# Patient Record
Sex: MALE | Race: WHITE | NOT HISPANIC OR LATINO
[De-identification: names, ages, dates, MRNs, and addresses within clinical notes are randomized per-mention and may not be internally consistent; named-entity substitution may affect disease eponyms.]

---

## 2017-07-25 ENCOUNTER — NON-APPOINTMENT (OUTPATIENT)
Age: 67
End: 2017-07-25

## 2017-07-25 ENCOUNTER — APPOINTMENT (OUTPATIENT)
Dept: CARDIOLOGY | Facility: CLINIC | Age: 67
End: 2017-07-25

## 2017-07-25 VITALS
BODY MASS INDEX: 21.92 KG/M2 | SYSTOLIC BLOOD PRESSURE: 111 MMHG | WEIGHT: 180 LBS | OXYGEN SATURATION: 98 % | DIASTOLIC BLOOD PRESSURE: 67 MMHG | HEART RATE: 43 BPM | HEIGHT: 76 IN

## 2017-07-25 DIAGNOSIS — Z82.0 FAMILY HISTORY OF EPILEPSY AND OTHER DISEASES OF THE NERVOUS SYSTEM: ICD-10-CM

## 2017-07-25 DIAGNOSIS — Z87.891 PERSONAL HISTORY OF NICOTINE DEPENDENCE: ICD-10-CM

## 2017-07-25 DIAGNOSIS — C18.1 MALIGNANT NEOPLASM OF APPENDIX: ICD-10-CM

## 2017-07-25 DIAGNOSIS — Z00.00 ENCOUNTER FOR GENERAL ADULT MEDICAL EXAMINATION W/OUT ABNORMAL FINDINGS: ICD-10-CM

## 2017-07-25 DIAGNOSIS — Z82.49 FAMILY HISTORY OF ISCHEMIC HEART DISEASE AND OTHER DISEASES OF THE CIRCULATORY SYSTEM: ICD-10-CM

## 2017-07-25 RX ORDER — MEMANTINE HYDROCHLORIDE 28 MG/1
28 CAPSULE, EXTENDED RELEASE ORAL
Refills: 0 | Status: ACTIVE | COMMUNITY

## 2017-07-25 RX ORDER — PSYLLIUM HUSK 0.4 G
CAPSULE ORAL
Refills: 0 | Status: ACTIVE | COMMUNITY

## 2017-07-25 RX ORDER — COLD-HOT PACK
125 MCG EACH MISCELLANEOUS DAILY
Refills: 0 | Status: ACTIVE | COMMUNITY

## 2017-07-25 RX ORDER — B-COMPLEX WITH VITAMIN C
TABLET ORAL DAILY
Refills: 0 | Status: ACTIVE | COMMUNITY

## 2017-07-25 RX ORDER — FLUOXETINE HYDROCHLORIDE 20 MG/1
20 CAPSULE ORAL
Refills: 0 | Status: ACTIVE | COMMUNITY

## 2017-07-26 ENCOUNTER — APPOINTMENT (OUTPATIENT)
Dept: CARDIOLOGY | Facility: CLINIC | Age: 67
End: 2017-07-26

## 2017-07-28 ENCOUNTER — NON-APPOINTMENT (OUTPATIENT)
Age: 67
End: 2017-07-28

## 2017-08-09 ENCOUNTER — APPOINTMENT (OUTPATIENT)
Dept: CARDIOLOGY | Facility: CLINIC | Age: 67
End: 2017-08-09

## 2017-08-14 ENCOUNTER — APPOINTMENT (OUTPATIENT)
Dept: CARDIOLOGY | Facility: CLINIC | Age: 67
End: 2017-08-14
Payer: MEDICARE

## 2017-08-14 PROCEDURE — 93015 CV STRESS TEST SUPVJ I&R: CPT

## 2017-09-15 ENCOUNTER — APPOINTMENT (OUTPATIENT)
Dept: CARDIOLOGY | Facility: CLINIC | Age: 67
End: 2017-09-15
Payer: MEDICARE

## 2017-09-15 PROCEDURE — A9500: CPT

## 2017-09-15 PROCEDURE — 78452 HT MUSCLE IMAGE SPECT MULT: CPT

## 2017-09-15 PROCEDURE — 93015 CV STRESS TEST SUPVJ I&R: CPT

## 2017-09-19 ENCOUNTER — APPOINTMENT (OUTPATIENT)
Dept: CARDIOLOGY | Facility: CLINIC | Age: 67
End: 2017-09-19
Payer: MEDICARE

## 2017-09-19 VITALS
BODY MASS INDEX: 21.55 KG/M2 | SYSTOLIC BLOOD PRESSURE: 114 MMHG | HEIGHT: 76 IN | WEIGHT: 177 LBS | OXYGEN SATURATION: 100 % | DIASTOLIC BLOOD PRESSURE: 68 MMHG | HEART RATE: 43 BPM

## 2017-09-19 DIAGNOSIS — R94.39 ABNORMAL RESULT OF OTHER CARDIOVASCULAR FUNCTION STUDY: ICD-10-CM

## 2017-09-19 DIAGNOSIS — I49.1 ATRIAL PREMATURE DEPOLARIZATION: ICD-10-CM

## 2017-09-19 PROCEDURE — 99214 OFFICE O/P EST MOD 30 MIN: CPT

## 2018-01-18 ENCOUNTER — NON-APPOINTMENT (OUTPATIENT)
Age: 68
End: 2018-01-18

## 2018-01-18 ENCOUNTER — APPOINTMENT (OUTPATIENT)
Dept: CARDIOLOGY | Facility: CLINIC | Age: 68
End: 2018-01-18
Payer: MEDICARE

## 2018-01-18 VITALS — SYSTOLIC BLOOD PRESSURE: 87 MMHG | DIASTOLIC BLOOD PRESSURE: 52 MMHG | HEART RATE: 41 BPM | OXYGEN SATURATION: 100 %

## 2018-01-18 VITALS — WEIGHT: 170 LBS | BODY MASS INDEX: 20.69 KG/M2

## 2018-01-18 DIAGNOSIS — R94.31 ABNORMAL ELECTROCARDIOGRAM [ECG] [EKG]: ICD-10-CM

## 2018-01-18 DIAGNOSIS — R42 DIZZINESS AND GIDDINESS: ICD-10-CM

## 2018-01-18 PROCEDURE — 93000 ELECTROCARDIOGRAM COMPLETE: CPT

## 2018-01-18 PROCEDURE — 99214 OFFICE O/P EST MOD 30 MIN: CPT | Mod: 25

## 2018-01-18 RX ORDER — DONEPEZIL HYDROCHLORIDE 10 MG/1
10 TABLET ORAL
Qty: 180 | Refills: 0 | Status: ACTIVE | COMMUNITY
Start: 2017-02-08

## 2018-01-18 RX ORDER — LOSARTAN POTASSIUM 100 MG/1
100 TABLET, FILM COATED ORAL DAILY
Qty: 30 | Refills: 5 | Status: DISCONTINUED | COMMUNITY
End: 2018-01-18

## 2018-01-18 RX ORDER — AMLODIPINE BESYLATE 5 MG/1
5 TABLET ORAL DAILY
Qty: 30 | Refills: 1 | Status: DISCONTINUED | COMMUNITY
End: 2018-01-18

## 2018-01-19 ENCOUNTER — APPOINTMENT (OUTPATIENT)
Dept: CARDIOLOGY | Facility: CLINIC | Age: 68
End: 2018-01-19
Payer: MEDICARE

## 2018-01-19 VITALS — OXYGEN SATURATION: 100 % | DIASTOLIC BLOOD PRESSURE: 66 MMHG | SYSTOLIC BLOOD PRESSURE: 116 MMHG | HEART RATE: 39 BPM

## 2018-01-19 VITALS — BODY MASS INDEX: 20.7 KG/M2 | HEIGHT: 76 IN | WEIGHT: 170 LBS

## 2018-01-19 DIAGNOSIS — I95.89 OTHER HYPOTENSION: ICD-10-CM

## 2018-01-19 PROCEDURE — 93000 ELECTROCARDIOGRAM COMPLETE: CPT

## 2018-01-19 PROCEDURE — 99213 OFFICE O/P EST LOW 20 MIN: CPT | Mod: 25

## 2018-02-08 ENCOUNTER — APPOINTMENT (OUTPATIENT)
Dept: CARDIOLOGY | Facility: CLINIC | Age: 68
End: 2018-02-08
Payer: MEDICARE

## 2018-02-08 ENCOUNTER — NON-APPOINTMENT (OUTPATIENT)
Age: 68
End: 2018-02-08

## 2018-02-08 VITALS — SYSTOLIC BLOOD PRESSURE: 123 MMHG | DIASTOLIC BLOOD PRESSURE: 75 MMHG

## 2018-02-08 VITALS — BODY MASS INDEX: 20.82 KG/M2 | HEIGHT: 76 IN | WEIGHT: 171 LBS

## 2018-02-08 VITALS — HEART RATE: 43 BPM | OXYGEN SATURATION: 100 %

## 2018-02-08 DIAGNOSIS — I10 ESSENTIAL (PRIMARY) HYPERTENSION: ICD-10-CM

## 2018-02-08 DIAGNOSIS — R00.1 BRADYCARDIA, UNSPECIFIED: ICD-10-CM

## 2018-02-08 DIAGNOSIS — I49.3 VENTRICULAR PREMATURE DEPOLARIZATION: ICD-10-CM

## 2018-02-08 DIAGNOSIS — F03.90 UNSPECIFIED DEMENTIA W/OUT BEHAVIORAL DISTURBANCE: ICD-10-CM

## 2018-02-08 PROCEDURE — 99214 OFFICE O/P EST MOD 30 MIN: CPT | Mod: 25

## 2018-02-08 PROCEDURE — 93000 ELECTROCARDIOGRAM COMPLETE: CPT

## 2018-02-08 RX ORDER — ASPIRIN 81 MG
81 TABLET, DELAYED RELEASE (ENTERIC COATED) ORAL DAILY
Qty: 90 | Refills: 0 | Status: ACTIVE | COMMUNITY

## 2018-03-03 ENCOUNTER — INPATIENT (INPATIENT)
Facility: HOSPITAL | Age: 68
LOS: 9 days | DRG: 57 | End: 2018-03-13
Attending: INTERNAL MEDICINE | Admitting: INTERNAL MEDICINE
Payer: MEDICARE

## 2018-03-03 VITALS
DIASTOLIC BLOOD PRESSURE: 75 MMHG | OXYGEN SATURATION: 99 % | TEMPERATURE: 98 F | SYSTOLIC BLOOD PRESSURE: 117 MMHG | HEART RATE: 72 BPM | RESPIRATION RATE: 18 BRPM

## 2018-03-03 DIAGNOSIS — R41.82 ALTERED MENTAL STATUS, UNSPECIFIED: ICD-10-CM

## 2018-03-03 DIAGNOSIS — G30.0 ALZHEIMER'S DISEASE WITH EARLY ONSET: ICD-10-CM

## 2018-03-03 LAB
ALBUMIN SERPL ELPH-MCNC: 4.1 G/DL — SIGNIFICANT CHANGE UP (ref 3.3–5)
ALP SERPL-CCNC: 36 U/L — LOW (ref 40–120)
ALT FLD-CCNC: 33 U/L RC — SIGNIFICANT CHANGE UP (ref 10–45)
ANION GAP SERPL CALC-SCNC: 13 MMOL/L — SIGNIFICANT CHANGE UP (ref 5–17)
APPEARANCE UR: CLEAR — SIGNIFICANT CHANGE UP
AST SERPL-CCNC: 34 U/L — SIGNIFICANT CHANGE UP (ref 10–40)
BACTERIA # UR AUTO: ABNORMAL /HPF
BASOPHILS # BLD AUTO: 0 K/UL — SIGNIFICANT CHANGE UP (ref 0–0.2)
BASOPHILS NFR BLD AUTO: 0.6 % — SIGNIFICANT CHANGE UP (ref 0–2)
BILIRUB SERPL-MCNC: 1.8 MG/DL — HIGH (ref 0.2–1.2)
BILIRUB UR-MCNC: NEGATIVE — SIGNIFICANT CHANGE UP
BUN SERPL-MCNC: 13 MG/DL — SIGNIFICANT CHANGE UP (ref 7–23)
CALCIUM SERPL-MCNC: 9.7 MG/DL — SIGNIFICANT CHANGE UP (ref 8.4–10.5)
CHLORIDE SERPL-SCNC: 103 MMOL/L — SIGNIFICANT CHANGE UP (ref 96–108)
CO2 SERPL-SCNC: 27 MMOL/L — SIGNIFICANT CHANGE UP (ref 22–31)
COLOR SPEC: SIGNIFICANT CHANGE UP
CREAT SERPL-MCNC: 0.76 MG/DL — SIGNIFICANT CHANGE UP (ref 0.5–1.3)
DIFF PNL FLD: ABNORMAL
EOSINOPHIL # BLD AUTO: 0 K/UL — SIGNIFICANT CHANGE UP (ref 0–0.5)
EOSINOPHIL NFR BLD AUTO: 0.5 % — SIGNIFICANT CHANGE UP (ref 0–6)
EPI CELLS # UR: SIGNIFICANT CHANGE UP /HPF
GAS PNL BLDV: SIGNIFICANT CHANGE UP
GLUCOSE SERPL-MCNC: 81 MG/DL — SIGNIFICANT CHANGE UP (ref 70–99)
GLUCOSE UR QL: NEGATIVE — SIGNIFICANT CHANGE UP
HCT VFR BLD CALC: 39 % — SIGNIFICANT CHANGE UP (ref 39–50)
HGB BLD-MCNC: 14 G/DL — SIGNIFICANT CHANGE UP (ref 13–17)
KETONES UR-MCNC: NEGATIVE — SIGNIFICANT CHANGE UP
LEUKOCYTE ESTERASE UR-ACNC: ABNORMAL
LYMPHOCYTES # BLD AUTO: 1 K/UL — SIGNIFICANT CHANGE UP (ref 1–3.3)
LYMPHOCYTES # BLD AUTO: 25.1 % — SIGNIFICANT CHANGE UP (ref 13–44)
MAGNESIUM SERPL-MCNC: 1.8 MG/DL — SIGNIFICANT CHANGE UP (ref 1.6–2.6)
MCHC RBC-ENTMCNC: 35 PG — HIGH (ref 27–34)
MCHC RBC-ENTMCNC: 35.9 GM/DL — SIGNIFICANT CHANGE UP (ref 32–36)
MCV RBC AUTO: 97.4 FL — SIGNIFICANT CHANGE UP (ref 80–100)
MONOCYTES # BLD AUTO: 0.4 K/UL — SIGNIFICANT CHANGE UP (ref 0–0.9)
MONOCYTES NFR BLD AUTO: 9 % — SIGNIFICANT CHANGE UP (ref 2–14)
NEUTROPHILS # BLD AUTO: 2.7 K/UL — SIGNIFICANT CHANGE UP (ref 1.8–7.4)
NEUTROPHILS NFR BLD AUTO: 64.8 % — SIGNIFICANT CHANGE UP (ref 43–77)
NITRITE UR-MCNC: NEGATIVE — SIGNIFICANT CHANGE UP
PH UR: 6.5 — SIGNIFICANT CHANGE UP (ref 5–8)
PLATELET # BLD AUTO: 191 K/UL — SIGNIFICANT CHANGE UP (ref 150–400)
POTASSIUM SERPL-MCNC: 3.6 MMOL/L — SIGNIFICANT CHANGE UP (ref 3.5–5.3)
POTASSIUM SERPL-SCNC: 3.6 MMOL/L — SIGNIFICANT CHANGE UP (ref 3.5–5.3)
PROT SERPL-MCNC: 6.9 G/DL — SIGNIFICANT CHANGE UP (ref 6–8.3)
PROT UR-MCNC: NEGATIVE — SIGNIFICANT CHANGE UP
RBC # BLD: 4 M/UL — LOW (ref 4.2–5.8)
RBC # FLD: 11.8 % — SIGNIFICANT CHANGE UP (ref 10.3–14.5)
RBC CASTS # UR COMP ASSIST: SIGNIFICANT CHANGE UP /HPF (ref 0–2)
SODIUM SERPL-SCNC: 143 MMOL/L — SIGNIFICANT CHANGE UP (ref 135–145)
SP GR SPEC: 1.01 — LOW (ref 1.01–1.02)
UROBILINOGEN FLD QL: NEGATIVE — SIGNIFICANT CHANGE UP
WBC # BLD: 4.2 K/UL — SIGNIFICANT CHANGE UP (ref 3.8–10.5)
WBC # FLD AUTO: 4.2 K/UL — SIGNIFICANT CHANGE UP (ref 3.8–10.5)
WBC UR QL: SIGNIFICANT CHANGE UP /HPF (ref 0–5)

## 2018-03-03 PROCEDURE — 71045 X-RAY EXAM CHEST 1 VIEW: CPT | Mod: 26

## 2018-03-03 PROCEDURE — 70450 CT HEAD/BRAIN W/O DYE: CPT | Mod: 26

## 2018-03-03 PROCEDURE — 99285 EMERGENCY DEPT VISIT HI MDM: CPT | Mod: 25

## 2018-03-03 PROCEDURE — 93010 ELECTROCARDIOGRAM REPORT: CPT

## 2018-03-03 RX ORDER — HEPARIN SODIUM 5000 [USP'U]/ML
5000 INJECTION INTRAVENOUS; SUBCUTANEOUS EVERY 12 HOURS
Qty: 0 | Refills: 0 | Status: DISCONTINUED | OUTPATIENT
Start: 2018-03-03 | End: 2018-03-13

## 2018-03-03 RX ORDER — OMEGA-3 ACID ETHYL ESTERS 1 G
1 CAPSULE ORAL
Qty: 0 | Refills: 0 | COMMUNITY

## 2018-03-03 RX ORDER — ASPIRIN/CALCIUM CARB/MAGNESIUM 324 MG
81 TABLET ORAL DAILY
Qty: 0 | Refills: 0 | Status: DISCONTINUED | OUTPATIENT
Start: 2018-03-03 | End: 2018-03-12

## 2018-03-03 RX ORDER — MEMANTINE HYDROCHLORIDE 10 MG/1
10 TABLET ORAL
Qty: 0 | Refills: 0 | Status: DISCONTINUED | OUTPATIENT
Start: 2018-03-03 | End: 2018-03-11

## 2018-03-03 RX ORDER — DONEPEZIL HYDROCHLORIDE 10 MG/1
10 TABLET, FILM COATED ORAL AT BEDTIME
Qty: 0 | Refills: 0 | Status: DISCONTINUED | OUTPATIENT
Start: 2018-03-03 | End: 2018-03-06

## 2018-03-03 RX ORDER — INFLUENZA VIRUS VACCINE 15; 15; 15; 15 UG/.5ML; UG/.5ML; UG/.5ML; UG/.5ML
0.5 SUSPENSION INTRAMUSCULAR ONCE
Qty: 0 | Refills: 0 | Status: DISCONTINUED | OUTPATIENT
Start: 2018-03-03 | End: 2018-03-13

## 2018-03-03 RX ORDER — FLUOXETINE HCL 10 MG
10 CAPSULE ORAL DAILY
Qty: 0 | Refills: 0 | Status: DISCONTINUED | OUTPATIENT
Start: 2018-03-03 | End: 2018-03-11

## 2018-03-03 RX ORDER — HALOPERIDOL DECANOATE 100 MG/ML
2.5 INJECTION INTRAMUSCULAR ONCE
Qty: 0 | Refills: 0 | Status: COMPLETED | OUTPATIENT
Start: 2018-03-03 | End: 2018-03-03

## 2018-03-03 RX ORDER — MEMANTINE HYDROCHLORIDE 10 MG/1
1 TABLET ORAL
Qty: 0 | Refills: 0 | COMMUNITY

## 2018-03-03 RX ORDER — CHOLECALCIFEROL (VITAMIN D3) 125 MCG
1 CAPSULE ORAL
Qty: 0 | Refills: 0 | COMMUNITY

## 2018-03-03 RX ORDER — SODIUM CHLORIDE 9 MG/ML
1000 INJECTION INTRAMUSCULAR; INTRAVENOUS; SUBCUTANEOUS ONCE
Qty: 0 | Refills: 0 | Status: COMPLETED | OUTPATIENT
Start: 2018-03-03 | End: 2018-03-03

## 2018-03-03 RX ORDER — FLUOXETINE HCL 10 MG
1 CAPSULE ORAL
Qty: 0 | Refills: 0 | COMMUNITY

## 2018-03-03 RX ORDER — ASPIRIN/CALCIUM CARB/MAGNESIUM 324 MG
1 TABLET ORAL
Qty: 0 | Refills: 0 | COMMUNITY

## 2018-03-03 RX ADMIN — HALOPERIDOL DECANOATE 2.5 MILLIGRAM(S): 100 INJECTION INTRAMUSCULAR at 15:00

## 2018-03-03 RX ADMIN — HALOPERIDOL DECANOATE 2.5 MILLIGRAM(S): 100 INJECTION INTRAMUSCULAR at 12:55

## 2018-03-03 RX ADMIN — DONEPEZIL HYDROCHLORIDE 10 MILLIGRAM(S): 10 TABLET, FILM COATED ORAL at 21:40

## 2018-03-03 RX ADMIN — HEPARIN SODIUM 5000 UNIT(S): 5000 INJECTION INTRAVENOUS; SUBCUTANEOUS at 21:41

## 2018-03-03 RX ADMIN — SODIUM CHLORIDE 3000 MILLILITER(S): 9 INJECTION INTRAMUSCULAR; INTRAVENOUS; SUBCUTANEOUS at 12:55

## 2018-03-03 NOTE — ED PROVIDER NOTE - NEUROLOGICAL LEVEL OF CONSCIOUSNESS
aao x 1 to name; follows  some commands; perrla eomi; no facial droop; right le strength 5/5 left 3/5; upper extr 4/5 bl

## 2018-03-03 NOTE — ED PROVIDER NOTE - OBJECTIVE STATEMENT
Pt has been treated for dementia for the past x3 years presents to the ED for incontinence and attempting t leave the house. Pt has no fever, chills. Pt has been to a neurologist and showed a possible mini stroke x3 yrs ago. This morning the pt was shaking and clenching his hands. Cardiologist is Dr. Palla [phone], neurologist is  Dr. Monica Toscano [phone], and the PMD is  Dr. Aurora Booker [phone]. Experiences no fever, chills, N/V. Experiences no palpitation, chest pain, or SOB. No constipation or diarrhea. No dysuria or hematuria. Pt has had no recent travel, and no sick contacts. Pt has been treated for dementia, bradycardia (being watched by his cardiologist no pacemaker at this time as per family) for the past x3 years presents to the ED for worsening incontinence and aggetation x 3 weeks, attempting to leave the house. Pt has no fever, chills. Pt has been to a neurologist and showed a possible mini stroke x3 yrs ago. This morning the pt was shaking and clenching his hands. Cardiologist is Dr. Palla [phone], neurologist is  Dr. Monica Toscano [phone], and the PMD is  Dr. Aurora Booker [phone]. Experiences no fever, chills, N/V. Experiences no palpitation, chest pain, or SOB. No constipation. + diarrhea 2 days ago. No hematuria. Pt has had no recent travel, and no sick contacts. family notes that pt is more unstable when he walks. P

## 2018-03-03 NOTE — ED ADULT NURSE NOTE - OBJECTIVE STATEMENT
66 y/o male presents to ED c/o worsening confusion over the past 3 weeks. Patient, at baseline A&Ox1 to self dx with dementia 3 years ago. Patient's wife reports worsening confusion over the past 3 weeks, with increasing agitation and bowel/bladder incontinence for the past 3 days. Patient's wife reports patient unsteady while ambulating and states there have been "several non-serious falls over the past 6 months". Patient's wife denies fever/chills, LOC, recent illness/travel, CP, SOB, palpitations, n/v/d. Patient A&Ox1, breathing spontaneously, airway patent, b/l clear lungs, +pulses, cap refill <2 seconds, abdomen nontender, +bowel sounds. Patient resting in bed, family at bedside, call bell within reach, side bells up. Plan of care explained.

## 2018-03-03 NOTE — ED ADULT NURSE REASSESSMENT NOTE - NS ED NURSE REASSESS COMMENT FT1
Patient placed on 1:1 observation for safety. Patient was attempting to get OOB without assistance. Patient safely in bed at this time with 1:1 at bedside.

## 2018-03-03 NOTE — H&P ADULT - HISTORY OF PRESENT ILLNESS
Pt has been treated for dementia, bradycardia (being watched by his cardiologist no pacemaker at this time as per family) for the past x3 years presents to the ED for worsening incontinence and aggetation x 3 weeks, attempting to leave the house. Pt has no fever, chills. Pt has been to a neurologist and showed a possible mini stroke x3 yrs ago. This morning the pt was shaking and clenching his hands. Cardiologist is Dr. Palla [phone], neurologist is  Dr. Monica Toscano [phone], and the PMD is  Dr. Aurora Booker [phone]. Experiences no fever, chills, N/V. Experiences no palpitation, chest pain, or SOB. No constipation. + diarrhea 2 days ago. No hematuria. Pt has had no recent travel, and no sick contacts. family notes that pt is more unstable when he walks.

## 2018-03-03 NOTE — ED PROVIDER NOTE - MEDICAL DECISION MAKING DETAILS
AMS- most likely worsening dementia with gait instability; urinary incontinence aggitation vs normal pressure hydrocephalus; vs infectious iteology--will fu cxr/ua labs; ct head--ADMIT

## 2018-03-03 NOTE — ED ADULT NURSE REASSESSMENT NOTE - NS ED NURSE REASSESS COMMENT FT1
Straight cath attempted with nash and coude catheter. Unable to obtain urine. 2nd RN present, sterile technique maintained. MD Tavera made aware.

## 2018-03-04 DIAGNOSIS — R00.1 BRADYCARDIA, UNSPECIFIED: ICD-10-CM

## 2018-03-04 LAB
ANION GAP SERPL CALC-SCNC: 9 MMOL/L — SIGNIFICANT CHANGE UP (ref 5–17)
BUN SERPL-MCNC: 10 MG/DL — SIGNIFICANT CHANGE UP (ref 7–23)
CALCIUM SERPL-MCNC: 9.3 MG/DL — SIGNIFICANT CHANGE UP (ref 8.4–10.5)
CHLORIDE SERPL-SCNC: 104 MMOL/L — SIGNIFICANT CHANGE UP (ref 96–108)
CO2 SERPL-SCNC: 30 MMOL/L — SIGNIFICANT CHANGE UP (ref 22–31)
CREAT SERPL-MCNC: 0.81 MG/DL — SIGNIFICANT CHANGE UP (ref 0.5–1.3)
CULTURE RESULTS: SIGNIFICANT CHANGE UP
GLUCOSE SERPL-MCNC: 85 MG/DL — SIGNIFICANT CHANGE UP (ref 70–99)
HCT VFR BLD CALC: 36.9 % — LOW (ref 39–50)
HGB BLD-MCNC: 12.9 G/DL — LOW (ref 13–17)
MCHC RBC-ENTMCNC: 34.2 PG — HIGH (ref 27–34)
MCHC RBC-ENTMCNC: 35.1 GM/DL — SIGNIFICANT CHANGE UP (ref 32–36)
MCV RBC AUTO: 97.4 FL — SIGNIFICANT CHANGE UP (ref 80–100)
PLATELET # BLD AUTO: 171 K/UL — SIGNIFICANT CHANGE UP (ref 150–400)
POTASSIUM SERPL-MCNC: 3.3 MMOL/L — LOW (ref 3.5–5.3)
POTASSIUM SERPL-SCNC: 3.3 MMOL/L — LOW (ref 3.5–5.3)
RBC # BLD: 3.78 M/UL — LOW (ref 4.2–5.8)
RBC # FLD: 11.7 % — SIGNIFICANT CHANGE UP (ref 10.3–14.5)
SODIUM SERPL-SCNC: 143 MMOL/L — SIGNIFICANT CHANGE UP (ref 135–145)
SPECIMEN SOURCE: SIGNIFICANT CHANGE UP
WBC # BLD: 4 K/UL — SIGNIFICANT CHANGE UP (ref 3.8–10.5)
WBC # FLD AUTO: 4 K/UL — SIGNIFICANT CHANGE UP (ref 3.8–10.5)

## 2018-03-04 RX ADMIN — HEPARIN SODIUM 5000 UNIT(S): 5000 INJECTION INTRAVENOUS; SUBCUTANEOUS at 05:38

## 2018-03-04 RX ADMIN — MEMANTINE HYDROCHLORIDE 10 MILLIGRAM(S): 10 TABLET ORAL at 05:39

## 2018-03-04 RX ADMIN — Medication 10 MILLIGRAM(S): at 07:43

## 2018-03-04 RX ADMIN — Medication 81 MILLIGRAM(S): at 07:43

## 2018-03-04 RX ADMIN — HEPARIN SODIUM 5000 UNIT(S): 5000 INJECTION INTRAVENOUS; SUBCUTANEOUS at 17:07

## 2018-03-04 RX ADMIN — MEMANTINE HYDROCHLORIDE 10 MILLIGRAM(S): 10 TABLET ORAL at 17:07

## 2018-03-04 RX ADMIN — DONEPEZIL HYDROCHLORIDE 10 MILLIGRAM(S): 10 TABLET, FILM COATED ORAL at 21:41

## 2018-03-04 NOTE — CONSULT NOTE ADULT - SUBJECTIVE AND OBJECTIVE BOX
Chief Complaint:  altered mental status    HPI:  Pt has been treated for dementia, bradycardia (being watched by his cardiologist no pacemaker at this time as per family) for the past x3 years presents to the ED for worsening incontinence and aggetation x 3 weeks, attempting to leave the house. Pt has no fever, chills. Pt has been to a neurologist and showed a possible mini stroke x3 yrs ago. This morning the pt was shaking and clenching his hands. Cardiologist is Dr. Palla [phone], neurologist is  Dr. Monica Toscano [phone], and the PMD is  Dr. Aurora Booker [phone]. Experiences no fever, chills, N/V. Experiences no palpitation, chest pain, or SOB. No constipation. + diarrhea 2 days ago. No hematuria. Pt has had no recent travel, and no sick contacts. family notes that pt is more unstable when he walks. (03 Mar 2018 19:53)    Patient's family not by bedside, patient very slow to respond however no complaints currently. Patient down to sinus evangelista in 50's on tele however has been asymptomatic/no changes in mental status during these episodes. Otherwise sinus 60-80's.  Patient sees Dr. Palla as outatpeint for cardiology. Per his notes, patient has had sinus bradycardia in past, had recent stress test where HR went to 85% target with exercise.       PMH:   Dementia      PSH:   No significant past surgical history      Medications:   aspirin  chewable 81 milliGRAM(s) Oral daily  donepezil 10 milliGRAM(s) Oral at bedtime  FLUoxetine 10 milliGRAM(s) Oral daily  heparin  Injectable 5000 Unit(s) SubCutaneous every 12 hours  influenza   Vaccine 0.5 milliLiter(s) IntraMuscular once  memantine 10 milliGRAM(s) Oral two times a day      Allergies:  No Known Allergies      FAMILY HISTORY:  No pertinent family history in first degree relatives      Social History:  Smoking History: denies  Alcohol Use: denies  Drug Use: denies    Review of Systems:  REVIEW OF SYSTEMS:    CONSTITUTIONAL: No weakness, fevers or chills  EYES/ENT: No visual changes;  No dysphagia  NECK: No pain or stiffness  RESPIRATORY: No cough, wheezing, hemoptysis; No shortness of breath  CARDIOVASCULAR: No chest pain or palpitations; No lower extremity edema  GASTROINTESTINAL: No abdominal or epigastric pain. No nausea, vomiting, or hematemesis; No diarrhea or constipation. No melena or hematochezia.  BACK: No back pain  GENITOURINARY: No dysuria, frequency or hematuria  NEUROLOGICAL: No numbness or weakness  SKIN: No itching, burning, rashes, or lesions       Physical Exam:  T(F): 97.4 (03-04), Max: 98.2 (03-03)  HR: 46 (03-04) (42 - 54)  BP: 147/84 (03-04) (147/84 - 172/82)  RR: 18 (03-04)  SpO2: 99% (03-04)  GENERAL: No acute distress  HEAD:  Atraumatic  ENT: no JVD  CHEST/LUNG: Clear to auscultation bilaterally  HEART: Regular rate and rhythm  ABDOMEN: Soft, Nontender  EXTREMITIES:  No edema  NEUROLOGY: alert but slow to respond to questions, non-focal, cranial nerves intact  SKIN: Normal color, No rashes or lesions  LINES:    Cardiovascular Diagnostic Testing:    ECG: sinus evangelista with PACs      Labs: Personally reviewed                        12.9   4.0   )-----------( 171      ( 04 Mar 2018 06:55 )             36.9     03-04    143  |  104  |  10  ----------------------------<  85  3.3<L>   |  30  |  0.81    Ca    9.3      04 Mar 2018 06:55  Mg     1.8     03-03    TPro  6.9  /  Alb  4.1  /  TBili  1.8<H>  /  DBili  x   /  AST  34  /  ALT  33  /  AlkPhos  36<L>  03-03

## 2018-03-05 LAB
ANION GAP SERPL CALC-SCNC: 11 MMOL/L — SIGNIFICANT CHANGE UP (ref 5–17)
ANION GAP SERPL CALC-SCNC: 4 MMOL/L — LOW (ref 5–17)
BUN SERPL-MCNC: 11 MG/DL — SIGNIFICANT CHANGE UP (ref 7–23)
BUN SERPL-MCNC: 14 MG/DL — SIGNIFICANT CHANGE UP (ref 7–23)
CALCIUM SERPL-MCNC: 9.5 MG/DL — SIGNIFICANT CHANGE UP (ref 8.4–10.5)
CALCIUM SERPL-MCNC: 9.6 MG/DL — SIGNIFICANT CHANGE UP (ref 8.4–10.5)
CHLORIDE SERPL-SCNC: 105 MMOL/L — SIGNIFICANT CHANGE UP (ref 96–108)
CHLORIDE SERPL-SCNC: 105 MMOL/L — SIGNIFICANT CHANGE UP (ref 96–108)
CO2 SERPL-SCNC: 28 MMOL/L — SIGNIFICANT CHANGE UP (ref 22–31)
CO2 SERPL-SCNC: 34 MMOL/L — HIGH (ref 22–31)
CREAT SERPL-MCNC: 0.71 MG/DL — SIGNIFICANT CHANGE UP (ref 0.5–1.3)
CREAT SERPL-MCNC: 0.75 MG/DL — SIGNIFICANT CHANGE UP (ref 0.5–1.3)
GLUCOSE SERPL-MCNC: 125 MG/DL — HIGH (ref 70–99)
GLUCOSE SERPL-MCNC: 96 MG/DL — SIGNIFICANT CHANGE UP (ref 70–99)
MAGNESIUM SERPL-MCNC: 1.7 MG/DL — SIGNIFICANT CHANGE UP (ref 1.6–2.6)
MAGNESIUM SERPL-MCNC: 1.8 MG/DL — SIGNIFICANT CHANGE UP (ref 1.6–2.6)
POTASSIUM SERPL-MCNC: 3.4 MMOL/L — LOW (ref 3.5–5.3)
POTASSIUM SERPL-MCNC: 4 MMOL/L — SIGNIFICANT CHANGE UP (ref 3.5–5.3)
POTASSIUM SERPL-SCNC: 3.4 MMOL/L — LOW (ref 3.5–5.3)
POTASSIUM SERPL-SCNC: 4 MMOL/L — SIGNIFICANT CHANGE UP (ref 3.5–5.3)
SODIUM SERPL-SCNC: 143 MMOL/L — SIGNIFICANT CHANGE UP (ref 135–145)
SODIUM SERPL-SCNC: 144 MMOL/L — SIGNIFICANT CHANGE UP (ref 135–145)
TSH SERPL-MCNC: 2.74 UIU/ML — SIGNIFICANT CHANGE UP (ref 0.27–4.2)

## 2018-03-05 RX ORDER — POTASSIUM CHLORIDE 20 MEQ
40 PACKET (EA) ORAL ONCE
Qty: 0 | Refills: 0 | Status: COMPLETED | OUTPATIENT
Start: 2018-03-05 | End: 2018-03-05

## 2018-03-05 RX ORDER — MAGNESIUM SULFATE 500 MG/ML
2 VIAL (ML) INJECTION ONCE
Qty: 0 | Refills: 0 | Status: COMPLETED | OUTPATIENT
Start: 2018-03-05 | End: 2018-03-05

## 2018-03-05 RX ADMIN — Medication 50 GRAM(S): at 23:20

## 2018-03-05 RX ADMIN — Medication 40 MILLIEQUIVALENT(S): at 09:05

## 2018-03-05 RX ADMIN — MEMANTINE HYDROCHLORIDE 10 MILLIGRAM(S): 10 TABLET ORAL at 05:19

## 2018-03-05 RX ADMIN — Medication 50 GRAM(S): at 07:04

## 2018-03-05 RX ADMIN — Medication 40 MILLIEQUIVALENT(S): at 05:58

## 2018-03-05 RX ADMIN — DONEPEZIL HYDROCHLORIDE 10 MILLIGRAM(S): 10 TABLET, FILM COATED ORAL at 22:02

## 2018-03-05 RX ADMIN — HEPARIN SODIUM 5000 UNIT(S): 5000 INJECTION INTRAVENOUS; SUBCUTANEOUS at 05:19

## 2018-03-05 RX ADMIN — Medication 10 MILLIGRAM(S): at 09:03

## 2018-03-05 RX ADMIN — Medication 81 MILLIGRAM(S): at 09:03

## 2018-03-05 RX ADMIN — MEMANTINE HYDROCHLORIDE 10 MILLIGRAM(S): 10 TABLET ORAL at 17:19

## 2018-03-05 RX ADMIN — HEPARIN SODIUM 5000 UNIT(S): 5000 INJECTION INTRAVENOUS; SUBCUTANEOUS at 17:19

## 2018-03-05 NOTE — PHYSICAL THERAPY INITIAL EVALUATION ADULT - PRECAUTIONS/LIMITATIONS, REHAB EVAL
isolation precautions/fall precautions/Pt has been treated for dementia, bradycardia (being watched by his cardiologist no pacemaker at this time as per family) for the past x3 years presents to the ED for worsening incontinence and aggetation x 3 weeks, attempting to leave the house. Pt has no fever, chills. Pt has been to a neurologist and showed a possible mini stroke x3 yrs ago. This morning the pt was shaking and clenching his hands. Cardiologist is Dr. Palla [phone], neurologist is  Dr. Monica Toscano [phone], and the PMD is  Dr. Aurora Booker [phone]. Experiences no fever, chills, N/V. Experiences no palpitation, chest pain, or SOB. No constipation. + diarrhea 2 days ago. No hematuria. Pt has had no recent travel, and no sick contacts. family notes that pt is more unstable when he walks. Pt has been treated for dementia, bradycardia (being watched by his cardiologist no pacemaker at this time as per family) for the past x3 years presents to the ED for worsening incontinence and aggetation x 3 weeks, attempting to leave the house. Pt has no fever, chills. Pt has been to a neurologist and showed a possible mini stroke x3 yrs ago. This morning the pt was shaking and clenching his hands. Cardiologist is Dr. Palla [phone], neurologist is  Dr. Monica Toscano [phone], and the PMD is  Dr. Aurora Booker [phone]. Experiences no fever, chills, N/V. Experiences no palpitation, chest pain, or SOB. No constipation. + diarrhea 2 days ago. No hematuria. Pt has had no recent travel, and no sick contacts. family notes that pt is more unstable when he walks./fall precautions

## 2018-03-05 NOTE — PROVIDER CONTACT NOTE (OTHER) - ASSESSMENT
Patient A&Ox1, calm. HR 51, /86, RR 18, O2 saturation 98% on room air. Patient denies chest pain, lightheadedness, or shortness of breath. Patient denies presence of pain.

## 2018-03-05 NOTE — PHYSICAL THERAPY INITIAL EVALUATION ADULT - TRANSFER SAFETY CONCERNS NOTED: SIT/STAND, REHAB EVAL
decreased balance during turns/losing balance/decreased proprioception/decreased safety awareness/decreased weight-shifting ability

## 2018-03-05 NOTE — PROGRESS NOTE ADULT - ASSESSMENT
68yo M with PMHx dementia, known sinus bradycardia presents to the ED for worsening incontinence and agitation x 3 weeks. Consulted for PPM evaluation. Per chart review, patient's cardiologist Dr. Palla patient has had sinus bradycardia in the past with chronotropic response with exercise. PAT, unknown if symptomatic, overnight.  -no PPM at this time  -hold AVN agents. will need to reconsider if symptomatic PAT.    please call with questions or if there is a change in clinical status.  [phone]

## 2018-03-05 NOTE — PHYSICAL THERAPY INITIAL EVALUATION ADULT - ADDITIONAL COMMENTS
HEAD CT :Parenchymal volume loss and chronic microvascular changes are again seen. Abnormal areas of high attenuation are identified in bilateral cerebellar, bilateral basal ganglia and bilateral centrum semiovale region. These findings are likely related to areas of mineralization and were present on the prior study and appear relatively unchanged. While these findings could be related to age, the possibility of underlying metabolic disorder cannot be entirely excluded;;        pt lives in house with spouse Tiara spouse [phone]) assist as need ; pt independent with functional activity and adl's ; 3 steps to enter with rail ; per CM note pt wife interest in Subacute rehab and transition to possible  long term care

## 2018-03-06 LAB
ANION GAP SERPL CALC-SCNC: 7 MMOL/L — SIGNIFICANT CHANGE UP (ref 5–17)
BUN SERPL-MCNC: 11 MG/DL — SIGNIFICANT CHANGE UP (ref 7–23)
CALCIUM SERPL-MCNC: 9.8 MG/DL — SIGNIFICANT CHANGE UP (ref 8.4–10.5)
CHLORIDE SERPL-SCNC: 104 MMOL/L — SIGNIFICANT CHANGE UP (ref 96–108)
CO2 SERPL-SCNC: 32 MMOL/L — HIGH (ref 22–31)
CREAT SERPL-MCNC: 0.74 MG/DL — SIGNIFICANT CHANGE UP (ref 0.5–1.3)
GLUCOSE SERPL-MCNC: 89 MG/DL — SIGNIFICANT CHANGE UP (ref 70–99)
MAGNESIUM SERPL-MCNC: 2.2 MG/DL — SIGNIFICANT CHANGE UP (ref 1.6–2.6)
POTASSIUM SERPL-MCNC: 3.9 MMOL/L — SIGNIFICANT CHANGE UP (ref 3.5–5.3)
POTASSIUM SERPL-SCNC: 3.9 MMOL/L — SIGNIFICANT CHANGE UP (ref 3.5–5.3)
SODIUM SERPL-SCNC: 143 MMOL/L — SIGNIFICANT CHANGE UP (ref 135–145)

## 2018-03-06 PROCEDURE — 93306 TTE W/DOPPLER COMPLETE: CPT | Mod: 26

## 2018-03-06 PROCEDURE — 93010 ELECTROCARDIOGRAM REPORT: CPT

## 2018-03-06 PROCEDURE — 75561 CARDIAC MRI FOR MORPH W/DYE: CPT | Mod: 26

## 2018-03-06 PROCEDURE — 70551 MRI BRAIN STEM W/O DYE: CPT | Mod: 26

## 2018-03-06 RX ORDER — POTASSIUM CHLORIDE 20 MEQ
40 PACKET (EA) ORAL ONCE
Qty: 0 | Refills: 0 | Status: COMPLETED | OUTPATIENT
Start: 2018-03-06 | End: 2018-03-06

## 2018-03-06 RX ADMIN — HEPARIN SODIUM 5000 UNIT(S): 5000 INJECTION INTRAVENOUS; SUBCUTANEOUS at 18:12

## 2018-03-06 RX ADMIN — MEMANTINE HYDROCHLORIDE 10 MILLIGRAM(S): 10 TABLET ORAL at 05:41

## 2018-03-06 RX ADMIN — HEPARIN SODIUM 5000 UNIT(S): 5000 INJECTION INTRAVENOUS; SUBCUTANEOUS at 05:41

## 2018-03-06 RX ADMIN — Medication 10 MILLIGRAM(S): at 11:26

## 2018-03-06 RX ADMIN — Medication 81 MILLIGRAM(S): at 11:25

## 2018-03-06 RX ADMIN — MEMANTINE HYDROCHLORIDE 10 MILLIGRAM(S): 10 TABLET ORAL at 18:09

## 2018-03-06 RX ADMIN — Medication 40 MILLIEQUIVALENT(S): at 02:05

## 2018-03-06 NOTE — PROVIDER CONTACT NOTE (OTHER) - ASSESSMENT
Pt asymptomatic. Denies chest pain, N/V, or discomfort. /113 . Mg 1.8 patient was given Mag sulfate. K4.0.

## 2018-03-06 NOTE — CHART NOTE - NSCHARTNOTEFT_GEN_A_CORE
NP Note (episodic)     Called by RN because pt's HR is 130-140s sustained for >20 minutes, occasionally evangelista for a few seconds, baseline bradycardic in the 40s. Pt is hemodynamically stable and asymptomatic - no CP palpitations dizziness.     - keep K >4, Mg >2   - c/w telemonitoring and close monitoring for symptoms   - EP aware (overnight spectra [id number]) will f/u in AM and is available for further recs overnight    - obtain EKG     - Depeika Guillen NP [id number]

## 2018-03-06 NOTE — CHART NOTE - NSCHARTNOTEFT_GEN_A_CORE
I attempted to see patient however he was not in his room.  As per wife and daughter the patient has had dementia for several years,  he is currently seeing an outpatient neurologist who has classified him as alzheimer's type.  The patient's history is significant for semantic/anomic aphasia.  He has had an acute decompensation since Wednesday of last with increased agitation as well incontinence and stiffness.  I cannot make recommendations until I have seen the patient.  However, donepezil can cause bradycardia and its acute role is limited, I would suggest discontinuation of donepezil.

## 2018-03-07 LAB
ANION GAP SERPL CALC-SCNC: 11 MMOL/L — SIGNIFICANT CHANGE UP (ref 5–17)
BUN SERPL-MCNC: 14 MG/DL — SIGNIFICANT CHANGE UP (ref 7–23)
CALCIUM SERPL-MCNC: 10 MG/DL — SIGNIFICANT CHANGE UP (ref 8.4–10.5)
CALCIUM SERPL-MCNC: 9.8 MG/DL — SIGNIFICANT CHANGE UP (ref 8.4–10.5)
CHLORIDE SERPL-SCNC: 104 MMOL/L — SIGNIFICANT CHANGE UP (ref 96–108)
CO2 SERPL-SCNC: 28 MMOL/L — SIGNIFICANT CHANGE UP (ref 22–31)
CREAT SERPL-MCNC: 0.72 MG/DL — SIGNIFICANT CHANGE UP (ref 0.5–1.3)
GLUCOSE SERPL-MCNC: 92 MG/DL — SIGNIFICANT CHANGE UP (ref 70–99)
HCT VFR BLD CALC: 41.5 % — SIGNIFICANT CHANGE UP (ref 39–50)
HGB BLD-MCNC: 14.4 G/DL — SIGNIFICANT CHANGE UP (ref 13–17)
MCHC RBC-ENTMCNC: 33.6 PG — SIGNIFICANT CHANGE UP (ref 27–34)
MCHC RBC-ENTMCNC: 34.7 GM/DL — SIGNIFICANT CHANGE UP (ref 32–36)
MCV RBC AUTO: 96.6 FL — SIGNIFICANT CHANGE UP (ref 80–100)
PLATELET # BLD AUTO: 179 K/UL — SIGNIFICANT CHANGE UP (ref 150–400)
POTASSIUM SERPL-MCNC: 3.9 MMOL/L — SIGNIFICANT CHANGE UP (ref 3.5–5.3)
POTASSIUM SERPL-SCNC: 3.9 MMOL/L — SIGNIFICANT CHANGE UP (ref 3.5–5.3)
PTH-INTACT FLD-MCNC: 31 PG/ML — SIGNIFICANT CHANGE UP (ref 15–65)
RBC # BLD: 4.29 M/UL — SIGNIFICANT CHANGE UP (ref 4.2–5.8)
RBC # FLD: 11.6 % — SIGNIFICANT CHANGE UP (ref 10.3–14.5)
SODIUM SERPL-SCNC: 143 MMOL/L — SIGNIFICANT CHANGE UP (ref 135–145)
WBC # BLD: 5.6 K/UL — SIGNIFICANT CHANGE UP (ref 3.8–10.5)
WBC # FLD AUTO: 5.6 K/UL — SIGNIFICANT CHANGE UP (ref 3.8–10.5)

## 2018-03-07 RX ORDER — AMLODIPINE BESYLATE 2.5 MG/1
1 TABLET ORAL
Qty: 0 | Refills: 0 | COMMUNITY
Start: 2018-03-07

## 2018-03-07 RX ORDER — DOCUSATE SODIUM 100 MG
1 CAPSULE ORAL
Qty: 0 | Refills: 0 | COMMUNITY
Start: 2018-03-07

## 2018-03-07 RX ORDER — DONEPEZIL HYDROCHLORIDE 10 MG/1
1 TABLET, FILM COATED ORAL
Qty: 0 | Refills: 0 | COMMUNITY

## 2018-03-07 RX ORDER — AMLODIPINE BESYLATE 2.5 MG/1
5 TABLET ORAL DAILY
Qty: 0 | Refills: 0 | Status: DISCONTINUED | OUTPATIENT
Start: 2018-03-07 | End: 2018-03-12

## 2018-03-07 RX ORDER — DOCUSATE SODIUM 100 MG
100 CAPSULE ORAL
Qty: 0 | Refills: 0 | Status: DISCONTINUED | OUTPATIENT
Start: 2018-03-07 | End: 2018-03-07

## 2018-03-07 RX ORDER — SENNA PLUS 8.6 MG/1
2 TABLET ORAL
Qty: 0 | Refills: 0 | COMMUNITY
Start: 2018-03-07

## 2018-03-07 RX ORDER — SENNA PLUS 8.6 MG/1
2 TABLET ORAL AT BEDTIME
Qty: 0 | Refills: 0 | Status: DISCONTINUED | OUTPATIENT
Start: 2018-03-07 | End: 2018-03-07

## 2018-03-07 RX ADMIN — Medication 81 MILLIGRAM(S): at 11:07

## 2018-03-07 RX ADMIN — MEMANTINE HYDROCHLORIDE 10 MILLIGRAM(S): 10 TABLET ORAL at 17:26

## 2018-03-07 RX ADMIN — AMLODIPINE BESYLATE 5 MILLIGRAM(S): 2.5 TABLET ORAL at 11:07

## 2018-03-07 RX ADMIN — HEPARIN SODIUM 5000 UNIT(S): 5000 INJECTION INTRAVENOUS; SUBCUTANEOUS at 17:26

## 2018-03-07 RX ADMIN — Medication 10 MILLIGRAM(S): at 11:07

## 2018-03-07 RX ADMIN — HEPARIN SODIUM 5000 UNIT(S): 5000 INJECTION INTRAVENOUS; SUBCUTANEOUS at 05:50

## 2018-03-07 RX ADMIN — MEMANTINE HYDROCHLORIDE 10 MILLIGRAM(S): 10 TABLET ORAL at 05:50

## 2018-03-07 NOTE — DISCHARGE NOTE ADULT - CARE PROVIDERS DIRECT ADDRESSES
,[email or web address],DirectAddress_Unknown,DirectAddress_Unknown,DirectAddress_Unknown

## 2018-03-07 NOTE — CHART NOTE - NSCHARTNOTEFT_GEN_A_CORE
Constant observation was discontinue on March 5TH, 2018. Pt remains calm and cooperative.   Pio NP   # [id number]

## 2018-03-07 NOTE — DISCHARGE NOTE ADULT - PATIENT PORTAL LINK FT
You can access the [facility] Patient Portal, offered by [facility], by registering with the following website: [email or web address]

## 2018-03-07 NOTE — DISCHARGE NOTE ADULT - PLAN OF CARE
stable Stable during hospital stay. MRI brain performed , no significant findings to explain worsening AMS   follow up with Neurology Per chart review, patient's cardiologist Dr. Palla patient has had sinus bradycardia in the past with chronotropic response with exercise. PAT, unknown if symptomatic  -no PPM at this time  -hold AVN agents. will need to reconsider if symptomatic PAT.  follow up with your own cardiologist Dementia causes memory problems and make it hard to think clear. The symptoms of dementia often start off very mild and get worse slowly. Symptoms are forgetfulness, confusion, trouble with language, getting lost in familiar places. As dementia gets worse, it can cause anger or aggression, see things that aren't there, decreased ability to eat, bathe, dress, or do other everyday tasks, or cause people to lose bladder and bowel control. Alzheimer disease, there are medicines that might help some. If you have vascular dementia, your doctor will focus on keeping your blood pressure and cholesterol as normal as possible. Sadly, there really aren't good treatments for most types of dementia.  Prevent falls and accidents by securing loose rugs or use non-skid rugs, loose wires or electrical cords. Wear sturdy, comfortable shoes, keep walkways well lit. There are no proven ways to prevent dementia. But encourage physical activity, healthy diet and social interaction to help keep the brain healthy. Keep medications, sharp knives, lighters, matches, power tools, and guns out of reach. Lower the temperature on water heaters. Keep familiar objects and people around. Use reminders, notes, or directions for daily activities or tasks. Keep a simple, consistent routine for waking, meals, bathing, dressing, and bedtime. Display emergency numbers and home address near all telephones.   SEEK MEDICAL CARE IF: New behavioral problems start such as moodiness, aggressiveness, or seeing things that are not there (hallucinations). Any new problem with brain function happens. This includes problems with balance, speech, swallowing difficulty or falling a lot. Small changes or worsening in any aspect of brain function can be a sign that the illness is getting worse or a sign of infection. Seeing a caregiver right away is important.   SEEK IMMEDIATE MEDICAL CARE IF: New or worsened confusion, sleepiness, or inability to sleep develops.

## 2018-03-07 NOTE — DISCHARGE NOTE ADULT - ADDITIONAL INSTRUCTIONS
follow up with your PM- Dr. Aurora Booker  follow up with the Neurologist form [facility] or your own - Dr. Toscano  follow up with your Cardiologist

## 2018-03-07 NOTE — DISCHARGE NOTE ADULT - CARE PROVIDER_API CALL
Palla, Venugopal R (MD), Cardiovascular Disease; Internal Medicine  [address] [place] [address]  Phone: [phone]  Fax: [phone]    Monica Toscano  Neurologist  Phone: [phone]  Fax: (   )    -    Aurora Booker  Phone: [phone]  Fax: (   )    -    Valeriano Davis), Neurology  [facility] [place] [address]  Phone: [phone]  Fax: [phone]

## 2018-03-07 NOTE — DISCHARGE NOTE ADULT - CARE PLAN
Principal Discharge DX:	Altered mental status, unspecified altered mental status type  Goal:	stable  Assessment and plan of treatment:	Stable during hospital stay. MRI brain performed , no significant findings to explain worsening AMS   follow up with Neurology  Secondary Diagnosis:	Bradycardia, sinus  Assessment and plan of treatment:	Per chart review, patient's cardiologist Dr. Palla patient has had sinus bradycardia in the past with chronotropic response with exercise. PAT, unknown if symptomatic  -no PPM at this time  -hold AVN agents. will need to reconsider if symptomatic PAT.  follow up with your own cardiologist  Secondary Diagnosis:	Dementia without behavioral disturbance, unspecified dementia type  Assessment and plan of treatment:	Dementia causes memory problems and make it hard to think clear. The symptoms of dementia often start off very mild and get worse slowly. Symptoms are forgetfulness, confusion, trouble with language, getting lost in familiar places. As dementia gets worse, it can cause anger or aggression, see things that aren't there, decreased ability to eat, bathe, dress, or do other everyday tasks, or cause people to lose bladder and bowel control. Alzheimer disease, there are medicines that might help some. If you have vascular dementia, your doctor will focus on keeping your blood pressure and cholesterol as normal as possible. Sadly, there really aren't good treatments for most types of dementia.  Prevent falls and accidents by securing loose rugs or use non-skid rugs, loose wires or electrical cords. Wear sturdy, comfortable shoes, keep walkways well lit. There are no proven ways to prevent dementia. But encourage physical activity, healthy diet and social interaction to help keep the brain healthy. Keep medications, sharp knives, lighters, matches, power tools, and guns out of reach. Lower the temperature on water heaters. Keep familiar objects and people around. Use reminders, notes, or directions for daily activities or tasks. Keep a simple, consistent routine for waking, meals, bathing, dressing, and bedtime. Display emergency numbers and home address near all telephones.   SEEK MEDICAL CARE IF: New behavioral problems start such as moodiness, aggressiveness, or seeing things that are not there (hallucinations). Any new problem with brain function happens. This includes problems with balance, speech, swallowing difficulty or falling a lot. Small changes or worsening in any aspect of brain function can be a sign that the illness is getting worse or a sign of infection. Seeing a caregiver right away is important.   SEEK IMMEDIATE MEDICAL CARE IF: New or worsened confusion, sleepiness, or inability to sleep develops.

## 2018-03-07 NOTE — DISCHARGE NOTE ADULT - PROVIDER TOKENS
TOKEN:'[id number]',FREE:[LAST:[Anto],FIRST:[Monica],PHONE:[phone]],FAX:[(   )    -],ADDRESS:[Neurologist]],FREE:[LAST:[Brand],FIRST:[Aurora],PHONE:[phone]],FAX:[(   )    -]],TOKEN:'[id number]'

## 2018-03-08 PROBLEM — F03.90 UNSPECIFIED DEMENTIA WITHOUT BEHAVIORAL DISTURBANCE: Chronic | Status: ACTIVE | Noted: 2018-03-03

## 2018-03-08 LAB
ANION GAP SERPL CALC-SCNC: 8 MMOL/L — SIGNIFICANT CHANGE UP (ref 5–17)
BUN SERPL-MCNC: 20 MG/DL — SIGNIFICANT CHANGE UP (ref 7–23)
CALCIUM SERPL-MCNC: 9.9 MG/DL — SIGNIFICANT CHANGE UP (ref 8.4–10.5)
CHLORIDE SERPL-SCNC: 105 MMOL/L — SIGNIFICANT CHANGE UP (ref 96–108)
CO2 SERPL-SCNC: 30 MMOL/L — SIGNIFICANT CHANGE UP (ref 22–31)
CREAT SERPL-MCNC: 0.75 MG/DL — SIGNIFICANT CHANGE UP (ref 0.5–1.3)
GLUCOSE BLDC GLUCOMTR-MCNC: 125 MG/DL — HIGH (ref 70–99)
GLUCOSE SERPL-MCNC: 129 MG/DL — HIGH (ref 70–99)
HCT VFR BLD CALC: 41.7 % — SIGNIFICANT CHANGE UP (ref 39–50)
HGB BLD-MCNC: 14.9 G/DL — SIGNIFICANT CHANGE UP (ref 13–17)
MCHC RBC-ENTMCNC: 34.5 PG — HIGH (ref 27–34)
MCHC RBC-ENTMCNC: 35.8 GM/DL — SIGNIFICANT CHANGE UP (ref 32–36)
MCV RBC AUTO: 96.4 FL — SIGNIFICANT CHANGE UP (ref 80–100)
PLATELET # BLD AUTO: 180 K/UL — SIGNIFICANT CHANGE UP (ref 150–400)
POTASSIUM SERPL-MCNC: 3.9 MMOL/L — SIGNIFICANT CHANGE UP (ref 3.5–5.3)
POTASSIUM SERPL-SCNC: 3.9 MMOL/L — SIGNIFICANT CHANGE UP (ref 3.5–5.3)
RBC # BLD: 4.32 M/UL — SIGNIFICANT CHANGE UP (ref 4.2–5.8)
RBC # FLD: 11.7 % — SIGNIFICANT CHANGE UP (ref 10.3–14.5)
SODIUM SERPL-SCNC: 143 MMOL/L — SIGNIFICANT CHANGE UP (ref 135–145)
WBC # BLD: 8 K/UL — SIGNIFICANT CHANGE UP (ref 3.8–10.5)
WBC # FLD AUTO: 8 K/UL — SIGNIFICANT CHANGE UP (ref 3.8–10.5)

## 2018-03-08 PROCEDURE — 71045 X-RAY EXAM CHEST 1 VIEW: CPT | Mod: 26

## 2018-03-08 PROCEDURE — 70450 CT HEAD/BRAIN W/O DYE: CPT | Mod: 26

## 2018-03-08 RX ORDER — SODIUM CHLORIDE 9 MG/ML
1000 INJECTION INTRAMUSCULAR; INTRAVENOUS; SUBCUTANEOUS
Qty: 0 | Refills: 0 | Status: DISCONTINUED | OUTPATIENT
Start: 2018-03-08 | End: 2018-03-12

## 2018-03-08 RX ADMIN — AMLODIPINE BESYLATE 5 MILLIGRAM(S): 2.5 TABLET ORAL at 05:33

## 2018-03-08 RX ADMIN — SODIUM CHLORIDE 50 MILLILITER(S): 9 INJECTION INTRAMUSCULAR; INTRAVENOUS; SUBCUTANEOUS at 16:15

## 2018-03-08 RX ADMIN — HEPARIN SODIUM 5000 UNIT(S): 5000 INJECTION INTRAVENOUS; SUBCUTANEOUS at 05:33

## 2018-03-08 RX ADMIN — MEMANTINE HYDROCHLORIDE 10 MILLIGRAM(S): 10 TABLET ORAL at 05:33

## 2018-03-08 RX ADMIN — HEPARIN SODIUM 5000 UNIT(S): 5000 INJECTION INTRAVENOUS; SUBCUTANEOUS at 18:47

## 2018-03-08 NOTE — CONSULT NOTE ADULT - SUBJECTIVE AND OBJECTIVE BOX
VASCULAR NEUROLOGY ATTENDING NOTE    Patient seen and examined history and imaging reviewed. Agree with resident/fellow note as applicable.    VITALS:  Vital Signs Last 24 Hrs  T(C): 37.1 (08 Mar 2018 09:51), Max: 37.4 (07 Mar 2018 21:25)  T(F): 98.7 (08 Mar 2018 09:51), Max: 99.3 (07 Mar 2018 21:25)  HR: 60 (08 Mar 2018 04:24) (60 - 80)  BP: 130/87 (08 Mar 2018 04:24) (121/79 - 162/87)  BP(mean): --  RR: 18 (08 Mar 2018 04:24) (17 - 18)  SpO2: 97% (08 Mar 2018 04:24) (97% - 97%)    Labs:   03-08    143  |  105  |  20  ----------------------------<  129<H>  3.9   |  30  |  0.75    Ca    9.9      08 Mar 2018 10:05                            14.9   8.0   )-----------( 180      ( 08 Mar 2018 10:05 )             41.7       MEDS:  amLODIPine   Tablet 5 milliGRAM(s) Oral daily  aspirin  chewable 81 milliGRAM(s) Oral daily  FLUoxetine 10 milliGRAM(s) Oral daily  heparin  Injectable 5000 Unit(s) SubCutaneous every 12 hours  influenza   Vaccine 0.5 milliLiter(s) IntraMuscular once  memantine 10 milliGRAM(s) Oral two times a day      Exam:   MS: Laying in bed mout open not following commands. Non-communicative  CNs:  PERRL, EOMI, VFF, face symmetric   Motor:  Diffuse gegenhalten rigidity moving within plane of bed  Sensory:  intact sensation throughout, no extinction  Coord:  no dysmetria, Gait: not tested, on bedrest

## 2018-03-09 ENCOUNTER — APPOINTMENT (OUTPATIENT)
Dept: MRI IMAGING | Facility: HOSPITAL | Age: 68
End: 2018-03-09

## 2018-03-09 LAB
ALBUMIN SERPL ELPH-MCNC: 3.8 G/DL — SIGNIFICANT CHANGE UP (ref 3.3–5)
ALP SERPL-CCNC: 40 U/L — SIGNIFICANT CHANGE UP (ref 40–120)
ALT FLD-CCNC: 22 U/L RC — SIGNIFICANT CHANGE UP (ref 10–45)
ANION GAP SERPL CALC-SCNC: 9 MMOL/L — SIGNIFICANT CHANGE UP (ref 5–17)
APPEARANCE UR: CLEAR — SIGNIFICANT CHANGE UP
AST SERPL-CCNC: 18 U/L — SIGNIFICANT CHANGE UP (ref 10–40)
BASOPHILS # BLD AUTO: 0.1 K/UL — SIGNIFICANT CHANGE UP (ref 0–0.2)
BASOPHILS NFR BLD AUTO: 0.8 % — SIGNIFICANT CHANGE UP (ref 0–2)
BILIRUB SERPL-MCNC: 2.2 MG/DL — HIGH (ref 0.2–1.2)
BILIRUB UR-MCNC: NEGATIVE — SIGNIFICANT CHANGE UP
BUN SERPL-MCNC: 17 MG/DL — SIGNIFICANT CHANGE UP (ref 7–23)
CALCIUM SERPL-MCNC: 9.8 MG/DL — SIGNIFICANT CHANGE UP (ref 8.4–10.5)
CHLORIDE SERPL-SCNC: 104 MMOL/L — SIGNIFICANT CHANGE UP (ref 96–108)
CO2 SERPL-SCNC: 28 MMOL/L — SIGNIFICANT CHANGE UP (ref 22–31)
COLOR SPEC: YELLOW — SIGNIFICANT CHANGE UP
CREAT SERPL-MCNC: 0.63 MG/DL — SIGNIFICANT CHANGE UP (ref 0.5–1.3)
DIFF PNL FLD: NEGATIVE — SIGNIFICANT CHANGE UP
EOSINOPHIL # BLD AUTO: 0 K/UL — SIGNIFICANT CHANGE UP (ref 0–0.5)
EOSINOPHIL NFR BLD AUTO: 0.7 % — SIGNIFICANT CHANGE UP (ref 0–6)
FERRITIN SERPL-MCNC: 194 NG/ML — SIGNIFICANT CHANGE UP (ref 30–400)
GLUCOSE SERPL-MCNC: 123 MG/DL — HIGH (ref 70–99)
GLUCOSE UR QL: NEGATIVE — SIGNIFICANT CHANGE UP
HCT VFR BLD CALC: 41.6 % — SIGNIFICANT CHANGE UP (ref 39–50)
HGB BLD-MCNC: 15.1 G/DL — SIGNIFICANT CHANGE UP (ref 13–17)
IRON SATN MFR SERPL: 35 % — SIGNIFICANT CHANGE UP (ref 16–55)
IRON SATN MFR SERPL: 86 UG/DL — SIGNIFICANT CHANGE UP (ref 45–165)
KETONES UR-MCNC: NEGATIVE — SIGNIFICANT CHANGE UP
LEUKOCYTE ESTERASE UR-ACNC: NEGATIVE — SIGNIFICANT CHANGE UP
LYMPHOCYTES # BLD AUTO: 1.1 K/UL — SIGNIFICANT CHANGE UP (ref 1–3.3)
LYMPHOCYTES # BLD AUTO: 15.8 % — SIGNIFICANT CHANGE UP (ref 13–44)
MAGNESIUM SERPL-MCNC: 1.7 MG/DL — SIGNIFICANT CHANGE UP (ref 1.6–2.6)
MCHC RBC-ENTMCNC: 34.8 PG — HIGH (ref 27–34)
MCHC RBC-ENTMCNC: 36.4 GM/DL — HIGH (ref 32–36)
MCV RBC AUTO: 95.8 FL — SIGNIFICANT CHANGE UP (ref 80–100)
MONOCYTES # BLD AUTO: 0.4 K/UL — SIGNIFICANT CHANGE UP (ref 0–0.9)
MONOCYTES NFR BLD AUTO: 6 % — SIGNIFICANT CHANGE UP (ref 2–14)
NEUTROPHILS # BLD AUTO: 5.1 K/UL — SIGNIFICANT CHANGE UP (ref 1.8–7.4)
NEUTROPHILS NFR BLD AUTO: 76.8 % — SIGNIFICANT CHANGE UP (ref 43–77)
NITRITE UR-MCNC: NEGATIVE — SIGNIFICANT CHANGE UP
PH UR: 6.5 — SIGNIFICANT CHANGE UP (ref 5–8)
PHOSPHATE SERPL-MCNC: 2.9 MG/DL — SIGNIFICANT CHANGE UP (ref 2.5–4.5)
PLATELET # BLD AUTO: 186 K/UL — SIGNIFICANT CHANGE UP (ref 150–400)
POTASSIUM SERPL-MCNC: 3.7 MMOL/L — SIGNIFICANT CHANGE UP (ref 3.5–5.3)
POTASSIUM SERPL-SCNC: 3.7 MMOL/L — SIGNIFICANT CHANGE UP (ref 3.5–5.3)
PROT SERPL-MCNC: 7.1 G/DL — SIGNIFICANT CHANGE UP (ref 6–8.3)
PROT UR-MCNC: SIGNIFICANT CHANGE UP
RBC # BLD: 4.35 M/UL — SIGNIFICANT CHANGE UP (ref 4.2–5.8)
RBC # FLD: 11.4 % — SIGNIFICANT CHANGE UP (ref 10.3–14.5)
SODIUM SERPL-SCNC: 141 MMOL/L — SIGNIFICANT CHANGE UP (ref 135–145)
SP GR SPEC: 1.02 — SIGNIFICANT CHANGE UP (ref 1.01–1.02)
TIBC SERPL-MCNC: 245 UG/DL — SIGNIFICANT CHANGE UP (ref 220–430)
UIBC SERPL-MCNC: 159 UG/DL — SIGNIFICANT CHANGE UP (ref 110–370)
UROBILINOGEN FLD QL: NEGATIVE — SIGNIFICANT CHANGE UP
WBC # BLD: 6.7 K/UL — SIGNIFICANT CHANGE UP (ref 3.8–10.5)
WBC # FLD AUTO: 6.7 K/UL — SIGNIFICANT CHANGE UP (ref 3.8–10.5)

## 2018-03-09 RX ORDER — LEVETIRACETAM 250 MG/1
500 TABLET, FILM COATED ORAL EVERY 12 HOURS
Qty: 0 | Refills: 0 | Status: DISCONTINUED | OUTPATIENT
Start: 2018-03-09 | End: 2018-03-11

## 2018-03-09 RX ADMIN — HEPARIN SODIUM 5000 UNIT(S): 5000 INJECTION INTRAVENOUS; SUBCUTANEOUS at 05:31

## 2018-03-09 RX ADMIN — AMLODIPINE BESYLATE 5 MILLIGRAM(S): 2.5 TABLET ORAL at 11:17

## 2018-03-09 RX ADMIN — LEVETIRACETAM 400 MILLIGRAM(S): 250 TABLET, FILM COATED ORAL at 18:42

## 2018-03-09 RX ADMIN — Medication 81 MILLIGRAM(S): at 11:16

## 2018-03-09 RX ADMIN — SODIUM CHLORIDE 50 MILLILITER(S): 9 INJECTION INTRAMUSCULAR; INTRAVENOUS; SUBCUTANEOUS at 16:17

## 2018-03-09 RX ADMIN — HEPARIN SODIUM 5000 UNIT(S): 5000 INJECTION INTRAVENOUS; SUBCUTANEOUS at 17:32

## 2018-03-09 NOTE — SWALLOW BEDSIDE ASSESSMENT ADULT - COMMENTS
Continued hospital course:   3/4: Provider contact note: + episodes of bradycardia.   3/4: Cardiology consulted for PPM evaluation. Per EP note 3/5: no PPM at this time Continued hospital course:   3/4: Provider contact note: + episodes of bradycardia.   3/4: Cardiology consulted for PPM evaluation. Per EP note 3/5: no PPM at this time    Neurology consulted: Per attending attestation: 67 year old man with history of progressive neurodegenerative process presents with some punctuated worsening. His lack of following is likely consistent with Alzheimer variants exhibited as such.     Most recent CT brain 3/8: IMPRESSION: Atrophy and small vessel white matter ischemic changes.   Physiologic parenchymal calcification unchanged from 3/3/2018  MRI Brain:  Impression: No evidence of acute infarction or hemorrhage, compared with CT dated 3/3/2018.  Extensive confluent chronic microvascular ischemic changes and multiple chronic lacunar infarctions, as described above.  Bilateral and fairly symmetric parenchymal mineralization in the cerebellar hemispheres and periventricular white matter. This finding may represent an underlying metabolic disorder, such as hypoparathyroidism, or possibly vascular etiology such as vasculopathy or venous congestion.   Small focal area of susceptibility effect in the left parafalcine frontal region in an extra-axial location, measuring 6-7 mm, without significant associated mass effect. This finding may represent a partially calcified meningioma.  CXRAY 3/8: clear lungs Continued hospital course:   3/4: Provider contact note: + episodes of bradycardia.   3/4: Cardiology consulted for PPM evaluation. Per EP note 3/5: no PPM at this time  3/8: care coordination note: Pt's discharged canceled due to pt's episode of loose BM r/o CDIFF and pt's mental status is obtunded.   Neurology consulted: Per attending attestation: 67 year old man with history of progressive neurodegenerative process presents with some punctuated worsening. His lack of following is likely consistent with Alzheimer variants exhibited as such.    Pe rd/w NP Demetrio, EEG pending at this time.   Most recent CT brain 3/8: IMPRESSION: Atrophy and small vessel white matter ischemic changes.   Physiologic parenchymal calcification unchanged from 3/3/2018  MRI Brain:  Impression: No evidence of acute infarction or hemorrhage, compared with CT dated 3/3/2018.  Extensive confluent chronic microvascular ischemic changes and multiple chronic lacunar infarctions, as described above.  Bilateral and fairly symmetric parenchymal mineralization in the cerebellar hemispheres and periventricular white matter. This finding may represent an underlying metabolic disorder, such as hypoparathyroidism, or possibly vascular etiology such as vasculopathy or venous congestion.   Small focal area of susceptibility effect in the left parafalcine frontal region in an extra-axial location, measuring 6-7 mm, without significant associated mass effect. This finding may represent a partially calcified meningioma.  CXRAY 3/8: clear lungs Continued hospital course:   3/4: Provider contact note: + episodes of bradycardia.   3/4: Cardiology consulted for PPM evaluation.   Per EP note 3/5: no PPM at this time  3/7: Event note NP: Constant observation was discontinued on March 5TH, 2018. Pt remains calm and cooperative.  3/8: care coordination note: Pt's discharged canceled due to pt's episode of loose BM r/o CDIFF and pt's mental status is obtunded.   Neurology consulted: Per attending attestation: 67 year old man with history of progressive neurodegenerative process presents with some punctuated worsening. His lack of following is likely consistent with Alzheimer variants exhibited as such.    Pe rd/w NP Demetrio, EEG pending at this time.   Most recent CT brain 3/8: IMPRESSION: Atrophy and small vessel white matter ischemic changes.   Physiologic parenchymal calcification unchanged from 3/3/2018  MRI Brain:  Impression: No evidence of acute infarction or hemorrhage, compared with CT dated 3/3/2018.  Extensive confluent chronic microvascular ischemic changes and multiple chronic lacunar infarctions, as described above.  Bilateral and fairly symmetric parenchymal mineralization in the cerebellar hemispheres and periventricular white matter. This finding may represent an underlying metabolic disorder, such as hypoparathyroidism, or possibly vascular etiology such as vasculopathy or venous congestion.   Small focal area of susceptibility effect in the left parafalcine frontal region in an extra-axial location, measuring 6-7 mm, without significant associated mass effect. This finding may represent a partially calcified meningioma.  CXRAY 3/8: clear lungs

## 2018-03-09 NOTE — PROGRESS NOTE ADULT - ASSESSMENT
67 year old man with decompensation in mental status and global functioning.  I spoke with the patient's outpatient neurologist who state recent LP was positive for alzheimer's marker on sylvia panel.  He was recently considered for neurosarcoid however this was not felt to be his diagnosis.  Given his decline in functioning at this time I would like to exclude seizure activity with an EEG.  Given normal PTH and Ca I would like to obtain heavy metal screen and check iron studies.

## 2018-03-09 NOTE — SWALLOW BEDSIDE ASSESSMENT ADULT - SWALLOW EVAL: RECOMMENDED DIET
NPO, with non-oral nutrition/hydration/medications. This service will reassess swallow mechanism on 3/10 pending improvement in mental status.

## 2018-03-09 NOTE — SWALLOW BEDSIDE ASSESSMENT ADULT - SLP GENERAL OBSERVATIONS
Pt found in bed, sleeping. Spouse and friends at bedside. Pt on room air. Baseline non-productive cough evident. Occasional tremors in bilateral upper extremities noted (NP aware) . Pt does not open eyes spontaneously or on command. Pt does not follow directions. Pt not responsive to verbal or tactile stimulation. PCA reports patient alert earlier and was able to consume ~25% of meal.

## 2018-03-09 NOTE — SWALLOW BEDSIDE ASSESSMENT ADULT - SWALLOW EVAL: PATIENT/FAMILY GOALS STATEMENT
Spouse describes patient as a "good eater" prior to admit. Friends report patient has "had a hard time finding his words" prior to hospitalization. ssss

## 2018-03-10 LAB
ANION GAP SERPL CALC-SCNC: 10 MMOL/L — SIGNIFICANT CHANGE UP (ref 5–17)
APTT BLD: 28.1 SEC — SIGNIFICANT CHANGE UP (ref 27.5–37.4)
BUN SERPL-MCNC: 21 MG/DL — SIGNIFICANT CHANGE UP (ref 7–23)
CALCIUM SERPL-MCNC: 9.7 MG/DL — SIGNIFICANT CHANGE UP (ref 8.4–10.5)
CHLORIDE SERPL-SCNC: 106 MMOL/L — SIGNIFICANT CHANGE UP (ref 96–108)
CO2 SERPL-SCNC: 28 MMOL/L — SIGNIFICANT CHANGE UP (ref 22–31)
CREAT SERPL-MCNC: 0.77 MG/DL — SIGNIFICANT CHANGE UP (ref 0.5–1.3)
GLUCOSE SERPL-MCNC: 90 MG/DL — SIGNIFICANT CHANGE UP (ref 70–99)
HCT VFR BLD CALC: 40.7 % — SIGNIFICANT CHANGE UP (ref 39–50)
HGB BLD-MCNC: 14.4 G/DL — SIGNIFICANT CHANGE UP (ref 13–17)
INR BLD: 1.15 RATIO — SIGNIFICANT CHANGE UP (ref 0.88–1.16)
MCHC RBC-ENTMCNC: 34.3 PG — HIGH (ref 27–34)
MCHC RBC-ENTMCNC: 35.4 GM/DL — SIGNIFICANT CHANGE UP (ref 32–36)
MCV RBC AUTO: 96.7 FL — SIGNIFICANT CHANGE UP (ref 80–100)
PLATELET # BLD AUTO: 164 K/UL — SIGNIFICANT CHANGE UP (ref 150–400)
POTASSIUM SERPL-MCNC: 3.8 MMOL/L — SIGNIFICANT CHANGE UP (ref 3.5–5.3)
POTASSIUM SERPL-SCNC: 3.8 MMOL/L — SIGNIFICANT CHANGE UP (ref 3.5–5.3)
PROTHROM AB SERPL-ACNC: 12.6 SEC — SIGNIFICANT CHANGE UP (ref 9.8–12.7)
RBC # BLD: 4.21 M/UL — SIGNIFICANT CHANGE UP (ref 4.2–5.8)
RBC # FLD: 11.6 % — SIGNIFICANT CHANGE UP (ref 10.3–14.5)
SODIUM SERPL-SCNC: 144 MMOL/L — SIGNIFICANT CHANGE UP (ref 135–145)
WBC # BLD: 5.4 K/UL — SIGNIFICANT CHANGE UP (ref 3.8–10.5)
WBC # FLD AUTO: 5.4 K/UL — SIGNIFICANT CHANGE UP (ref 3.8–10.5)

## 2018-03-10 PROCEDURE — 95819 EEG AWAKE AND ASLEEP: CPT | Mod: 26

## 2018-03-10 RX ORDER — NYSTATIN CREAM 100000 [USP'U]/G
1 CREAM TOPICAL
Qty: 0 | Refills: 0 | Status: DISCONTINUED | OUTPATIENT
Start: 2018-03-10 | End: 2018-03-13

## 2018-03-10 RX ORDER — ACETAMINOPHEN 500 MG
1000 TABLET ORAL ONCE
Qty: 0 | Refills: 0 | Status: COMPLETED | OUTPATIENT
Start: 2018-03-10 | End: 2018-03-10

## 2018-03-10 RX ADMIN — HEPARIN SODIUM 5000 UNIT(S): 5000 INJECTION INTRAVENOUS; SUBCUTANEOUS at 17:56

## 2018-03-10 RX ADMIN — LEVETIRACETAM 400 MILLIGRAM(S): 250 TABLET, FILM COATED ORAL at 17:55

## 2018-03-10 RX ADMIN — LEVETIRACETAM 400 MILLIGRAM(S): 250 TABLET, FILM COATED ORAL at 05:16

## 2018-03-10 RX ADMIN — Medication 400 MILLIGRAM(S): at 16:39

## 2018-03-10 RX ADMIN — Medication 1000 MILLIGRAM(S): at 17:10

## 2018-03-10 RX ADMIN — SODIUM CHLORIDE 50 MILLILITER(S): 9 INJECTION INTRAMUSCULAR; INTRAVENOUS; SUBCUTANEOUS at 03:00

## 2018-03-10 RX ADMIN — Medication 0.5 MILLIGRAM(S): at 10:45

## 2018-03-10 RX ADMIN — Medication 0.5 MILLIGRAM(S): at 19:15

## 2018-03-10 RX ADMIN — MEMANTINE HYDROCHLORIDE 10 MILLIGRAM(S): 10 TABLET ORAL at 17:47

## 2018-03-10 RX ADMIN — SODIUM CHLORIDE 50 MILLILITER(S): 9 INJECTION INTRAMUSCULAR; INTRAVENOUS; SUBCUTANEOUS at 17:29

## 2018-03-10 RX ADMIN — NYSTATIN CREAM 1 APPLICATION(S): 100000 CREAM TOPICAL at 17:29

## 2018-03-10 RX ADMIN — NYSTATIN CREAM 1 APPLICATION(S): 100000 CREAM TOPICAL at 05:16

## 2018-03-10 RX ADMIN — AMLODIPINE BESYLATE 5 MILLIGRAM(S): 2.5 TABLET ORAL at 17:48

## 2018-03-10 NOTE — PROGRESS NOTE ADULT - ATTENDING COMMENTS
67 year old man with standing history of neurodegenerative disease with acute decompensation in mental status.  There is no identifiable cause for his decompensation based on imaging or laboratory studies.  I attempted a lumbar puncture today however was not able to obtain CSF, will discuss with IR.  I have asked that an EEG be performed.   Hemodynamically stable.    -Monitor Clinically  -D/W Family, they are considering palliative option.

## 2018-03-10 NOTE — PROCEDURE NOTE - ADDITIONAL PROCEDURE DETAILS
Multiple attempts made at both L3/4 and L4/5 intervertebral space without success at CSF production.

## 2018-03-10 NOTE — PROCEDURE NOTE - NSPROCDETAILS_GEN_ALL_CORE
location identified, draped/prepped, sterile technique used, needle inserted/introduced/procedure aborted

## 2018-03-10 NOTE — SWALLOW BEDSIDE ASSESSMENT ADULT - COMMENTS
Continued hospital course:   3/4: Provider contact note: + episodes of bradycardia.   3/4: Cardiology consulted for PPM evaluation.   Per EP note 3/5: no PPM at this time  3/7: Event note NP: Constant observation was discontinued on March 5TH, 2018. Pt remains calm and cooperative.  3/8: care coordination note: Pt's discharged canceled due to pt's episode of loose BM r/o CDIFF and pt's mental status is obtunded.   Neurology consulted: Per attending attestation: 67 year old man with history of progressive neurodegenerative process presents with some punctuated worsening. His lack of following is likely consistent with Alzheimer variants exhibited as such.    Pe rd/w NP Demetrio, EEG pending at this time.   Most recent CT brain 3/8: IMPRESSION: Atrophy and small vessel white matter ischemic changes.   Physiologic parenchymal calcification unchanged from 3/3/2018  MRI Brain:  Impression: No evidence of acute infarction or hemorrhage, compared with CT dated 3/3/2018.  Extensive confluent chronic microvascular ischemic changes and multiple chronic lacunar infarctions, as described above.  Bilateral and fairly symmetric parenchymal mineralization in the cerebellar hemispheres and periventricular white matter. This finding may represent an underlying metabolic disorder, such as hypoparathyroidism, or possibly vascular etiology such as vasculopathy or venous congestion.   Small focal area of susceptibility effect in the left parafalcine frontal region in an extra-axial location, measuring 6-7 mm, without significant associated mass effect. This finding may represent a partially calcified meningioma.  CXRAY 3/8: clear lungs    3/10 S/p attempted LP, however per Neuro unable to obtain CSF. Per d/w RN Belinda, pt is able to sit upright at this time for participation in bedside swallow evaluation. Continued hospital course:   3/4: Provider contact note: + episodes of bradycardia.   3/4: Cardiology consulted for PPM evaluation.   Per EP note 3/5: no PPM at this time  3/7: Event note NP: Constant observation was discontinued on March 5TH, 2018. Pt remains calm and cooperative.  3/8: care coordination note: Pt's discharged canceled due to pt's episode of loose BM r/o CDIFF and pt's mental status is obtunded.   Neurology consulted: Per attending attestation: 67 year old man with history of progressive neurodegenerative process presents with some punctuated worsening. His lack of following is likely consistent with Alzheimer variants exhibited as such.    Pe rd/w NP Demetrio, EEG pending at this time.   Most recent CT brain 3/8: IMPRESSION: Atrophy and small vessel white matter ischemic changes.   Physiologic parenchymal calcification unchanged from 3/3/2018  MRI Brain:  Impression: No evidence of acute infarction or hemorrhage, compared with CT dated 3/3/2018.  Extensive confluent chronic microvascular ischemic changes and multiple chronic lacunar infarctions, as described above.  Bilateral and fairly symmetric parenchymal mineralization in the cerebellar hemispheres and periventricular white matter. This finding may represent an underlying metabolic disorder, such as hypoparathyroidism, or possibly vascular etiology such as vasculopathy or venous congestion.   Small focal area of susceptibility effect in the left parafalcine frontal region in an extra-axial location, measuring 6-7 mm, without significant associated mass effect. This finding may represent a partially calcified meningioma.  CXRAY 3/8: clear lungs    3/10 S/p attempted LP, however per Neuro unable to obtain CSF.

## 2018-03-10 NOTE — EEG REPORT - NS EEG TEXT BOX
[facility]  Report of Routine EEG with Video    [facility]: [address], [address] [place], [place] [address], Phone: [phone]  [facility] [place]: [address] [place] [address], [place] [address], Phone: [phone]  Office: [address] [facility], [facility] [address], [place] [address], Phone: [phone]    Patient Name: Moe Murillo   Age: 67 y  : 1950  Patient ID: -, MRN #: MR# [id number], Location: [address] [place] [address]   Referring Physician: Valeriano Davis     EEG #: [id number]  Study Date: 3/10/2018		    Technical Information:					  On Instrument: -  Placement and Labeling of Electrodes:  The EEG was performed utilizing 20 channels referential EEG connections (coronal over temporal over parasagittal montage) using all standard 10-20 electrode placements with EKG.  Recording was at a sampling rate of 256 samples per second per channel.  Time synchronized digital video recording was done simultaneously with EEG recording.  A low light infrared camera was used for low light recording.  Sanjay and seizure detection algorithms were utilized.    History:  67 year old man with standing history of neurodegenerative disease with acute decompensation in mental status    Medication	  Keppra	    Study Interpretation:    FINDINGS:  The background was continuous, spontaneously variable and minimally reactive. Background consist of diffuse theta and polymorphic delta activities. .  No discernable PDR seen (patient did not sustain full wakefulness.)      Sleep Background:  Drowsiness was characterized by fragmentation, attenuation, and slowing of the background activity.    Stage II sleep transients were not recorded.    Other Paroxysmal Non-Epileptiform Findings:  None were present.    Interictal Epileptiform Activity:   None were present.    Events:  Clinical events: None recorded.  Seizures: None recorded.    Activation Procedures:   Hyperventilation was not performed.    Photic stimulation was not performed.    Artifacts:  Intermittent myogenic and movement artifacts were noted.    ECG:  The heart rate on single channel ECG was predominantly between 55-65  BPM.    EEG Summary / Classification:  Abnormal EEG altered patient.  -Mild to moderate generalized slowing     EEG Impression / Clinical Correlate:  Abnormal EEG altered patient.  1.	Mild to moderate non-specific diffuse or multifocal cerebral dysfunction      2.	There were no epileptiform abnormalities recorded.      Preliminary Fellow Read:  Guerda Owens MD  ________________________________________  Neurophysiology/Epilepsy Fellow, [facility] [place] [facility]  Report of Routine EEG with Video    [facility]: [address], [address] [place], [place] [address], Phone: [phone]  [facility]: [address] [place] [address], [place] [address], Phone: [phone]  Office: 1 [facility], [facility] [address], [place] [address], Phone: [phone]    Patient Name: Moe Murillo   Age: 67 y  : 1950  Patient ID: -, MRN #: MR# [id number], Location: [address] [place] [address]   Referring Physician: Valeriano Davis     EEG #: [id number]  Study Date: 3/10/2018		    Technical Information:					  On Instrument: -  Placement and Labeling of Electrodes:  The EEG was performed utilizing 20 channels referential EEG connections (coronal over temporal over parasagittal montage) using all standard 10-20 electrode placements with EKG.  Recording was at a sampling rate of 256 samples per second per channel.  Time synchronized digital video recording was done simultaneously with EEG recording.  A low light infrared camera was used for low light recording.  Sanjay and seizure detection algorithms were utilized.    History:  67 year old man with standing history of neurodegenerative disease with acute decompensation in mental status    Medication	  Keppra	    Study Interpretation:    FINDINGS:  The background was continuous, spontaneously variable and minimally reactive. Background consist of diffuse theta and polymorphic delta activities.  No definitive PDR seen.      Sleep Background:  Drowsiness was characterized by fragmentation, attenuation, and slowing of the background activity.    Stage II sleep transients were not recorded.    Other Paroxysmal Non-Epileptiform Findings:  None were present.    Interictal Epileptiform Activity:   None were present.    Events:  Clinical events: None recorded.  Seizures: None recorded.    Activation Procedures:   Hyperventilation was not performed.    Photic stimulation was not performed.    Artifacts:  Intermittent myogenic and movement artifacts were noted.    ECG:  The heart rate on single channel ECG was predominantly between 55-65  BPM.    EEG Summary / Classification:  Abnormal EEG in a lethargic patient.  -Modate generalized slowing     EEG Impression / Clinical Correlate:  Abnormal EEG in a lethargic patient.  1.	Moderate non-specific diffuse or multifocal cerebral dysfunction      2.	There were no epileptiform abnormalities recorded.        Guerda Owens MD  ________________________________________  Neurophysiology/Epilepsy Fellow, [facility] [place]      Deepak Blackwell MD  Attending Physician, [facility] [place]

## 2018-03-11 LAB
-  AMPICILLIN/SULBACTAM: SIGNIFICANT CHANGE UP
-  CEFAZOLIN: SIGNIFICANT CHANGE UP
-  CIPROFLOXACIN: SIGNIFICANT CHANGE UP
-  GENTAMICIN: SIGNIFICANT CHANGE UP
-  LEVOFLOXACIN: SIGNIFICANT CHANGE UP
-  NITROFURANTOIN: SIGNIFICANT CHANGE UP
-  OXACILLIN: SIGNIFICANT CHANGE UP
-  PENICILLIN: SIGNIFICANT CHANGE UP
-  RIFAMPIN: SIGNIFICANT CHANGE UP
-  TETRACYCLINE: SIGNIFICANT CHANGE UP
-  TRIMETHOPRIM/SULFAMETHOXAZOLE: SIGNIFICANT CHANGE UP
-  VANCOMYCIN: SIGNIFICANT CHANGE UP
ANION GAP SERPL CALC-SCNC: 12 MMOL/L — SIGNIFICANT CHANGE UP (ref 5–17)
BUN SERPL-MCNC: 16 MG/DL — SIGNIFICANT CHANGE UP (ref 7–23)
CALCIUM SERPL-MCNC: 9.9 MG/DL — SIGNIFICANT CHANGE UP (ref 8.4–10.5)
CHLORIDE SERPL-SCNC: 104 MMOL/L — SIGNIFICANT CHANGE UP (ref 96–108)
CO2 SERPL-SCNC: 27 MMOL/L — SIGNIFICANT CHANGE UP (ref 22–31)
CREAT SERPL-MCNC: 0.62 MG/DL — SIGNIFICANT CHANGE UP (ref 0.5–1.3)
CULTURE RESULTS: SIGNIFICANT CHANGE UP
GLUCOSE SERPL-MCNC: 103 MG/DL — HIGH (ref 70–99)
METHOD TYPE: SIGNIFICANT CHANGE UP
ORGANISM # SPEC MICROSCOPIC CNT: SIGNIFICANT CHANGE UP
ORGANISM # SPEC MICROSCOPIC CNT: SIGNIFICANT CHANGE UP
POTASSIUM SERPL-MCNC: 3.4 MMOL/L — LOW (ref 3.5–5.3)
POTASSIUM SERPL-SCNC: 3.4 MMOL/L — LOW (ref 3.5–5.3)
SODIUM SERPL-SCNC: 143 MMOL/L — SIGNIFICANT CHANGE UP (ref 135–145)
SPECIMEN SOURCE: SIGNIFICANT CHANGE UP

## 2018-03-11 PROCEDURE — 95951: CPT | Mod: 26

## 2018-03-11 RX ORDER — SALIVA SUBSTITUTE COMB NO.11 351 MG
1 POWDER IN PACKET (EA) MUCOUS MEMBRANE
Qty: 0 | Refills: 0 | Status: DISCONTINUED | OUTPATIENT
Start: 2018-03-11 | End: 2018-03-13

## 2018-03-11 RX ORDER — POTASSIUM CHLORIDE 20 MEQ
40 PACKET (EA) ORAL ONCE
Qty: 0 | Refills: 0 | Status: DISCONTINUED | OUTPATIENT
Start: 2018-03-11 | End: 2018-03-11

## 2018-03-11 RX ORDER — SALIVA SUBSTITUTE COMB NO.11 351 MG
5 POWDER IN PACKET (EA) MUCOUS MEMBRANE
Qty: 0 | Refills: 0 | Status: DISCONTINUED | OUTPATIENT
Start: 2018-03-11 | End: 2018-03-11

## 2018-03-11 RX ORDER — MORPHINE SULFATE 50 MG/1
0.5 CAPSULE, EXTENDED RELEASE ORAL ONCE
Qty: 0 | Refills: 0 | Status: DISCONTINUED | OUTPATIENT
Start: 2018-03-11 | End: 2018-03-11

## 2018-03-11 RX ADMIN — NYSTATIN CREAM 1 APPLICATION(S): 100000 CREAM TOPICAL at 05:36

## 2018-03-11 RX ADMIN — HEPARIN SODIUM 5000 UNIT(S): 5000 INJECTION INTRAVENOUS; SUBCUTANEOUS at 05:36

## 2018-03-11 RX ADMIN — LEVETIRACETAM 400 MILLIGRAM(S): 250 TABLET, FILM COATED ORAL at 05:35

## 2018-03-11 RX ADMIN — Medication 0.5 MILLIGRAM(S): at 22:20

## 2018-03-11 RX ADMIN — SODIUM CHLORIDE 50 MILLILITER(S): 9 INJECTION INTRAMUSCULAR; INTRAVENOUS; SUBCUTANEOUS at 11:37

## 2018-03-11 RX ADMIN — NYSTATIN CREAM 1 APPLICATION(S): 100000 CREAM TOPICAL at 17:15

## 2018-03-11 RX ADMIN — MORPHINE SULFATE 0.5 MILLIGRAM(S): 50 CAPSULE, EXTENDED RELEASE ORAL at 17:24

## 2018-03-11 RX ADMIN — Medication 1 SPRAY(S): at 17:15

## 2018-03-11 RX ADMIN — Medication 0.5 MILLIGRAM(S): at 11:31

## 2018-03-11 NOTE — EEG REPORT - NS EEG TEXT BOX
[facility]  Report of Continuos  EEG with Video    [facility]: [address], [address] [place], [place] [address], Phone: [phone]  [facility]: [address], [place] [address], Phone: [phone]  Office: 1 [facility], [facility] [address], [place] [address], Phone: [phone]    Patient Name: Moe Murillo   Age: 67 y  : 1950  Patient ID: -, MRN #: MR# [id number], Location: [address] [place] [address]   Referring Physician: Valeriano Davis     EEG #: [id number]  Study Date: 3/10/2018	-3/11/2018 	    History:  67 year old man with standing history of neurodegenerative disease with acute decompensation in mental status    Medication	  Keppra 500 bid  Ativan PRN 	    Study Interpretation:    FINDINGS:  The background was continuous, spontaneously variable and  reactive. Background consist of diffuse theta and polymorphic delta activities.  No definitive PDR seen.      Sleep Background:  Drowsiness was characterized by fragmentation, attenuation, and slowing of the background activity.    Stage II sleep transients were not recorded.    Other Paroxysmal Non-Epileptiform Findings:  None were present.    Interictal Epileptiform Activity:   None were present.    Events:  Clinical events: None recorded.  Seizures: None recorded.    Activation Procedures:   Hyperventilation was not performed.    Photic stimulation was not performed.    Artifacts:  Intermittent myogenic and movement artifacts were noted. Patient removed leads at 18:87    ECG:  The heart rate on single channel ECG was predominantly between 55-65  BPM.    EEG Summary / Classification:  Abnormal EEG in a lethargic patient.  -Moderate generalized slowing     EEG Impression / Clinical Correlate:  Abnormal EEG in a lethargic patient.  1.	Moderate non-specific diffuse or multifocal cerebral dysfunction      2.	There were no epileptiform abnormalities recorded.      Preliminary Fellow Read:  Guerda Owens MD  ________________________________________  Neurophysiology/Epilepsy Fellow, [facility] [place] [facility]  Report of Continuos  EEG with Video    [facility]: [address], [address] [place], [place] [address], Phone: [phone]  [facility]: [address] [address], [place] [address], Phone: [phone]  Office: 1 [facility], [facility] [address], [place] [address], Phone: [phone]    Patient Name: Moe Murillo   Age: 67 y  : 1950  Patient ID: -, MRN #: MR# [id number], Location: [address] [place] [address]   Referring Physician: Valeriano Davis     EEG #: [id number]  Study Date: 3/10/2018	-3/11/2018 	    History:  67 year old man with standing history of neurodegenerative disease with acute decompensation in mental status    Medication	  Keppra 500 bid  Ativan PRN 	    Study Interpretation:    FINDINGS:  The background was continuous, spontaneously variable and  reactive. Background consist of diffuse theta and polymorphic delta activities. No definitive PDR seen.      Sleep Background:  Drowsiness was characterized by fragmentation, attenuation, and slowing of the background activity.    Stage II sleep transients were not recorded.    Other Paroxysmal Non-Epileptiform Findings:  None were present.    Interictal Epileptiform Activity:   None were present.    Events:  Clinical events: None recorded.  Seizures: None recorded.    Activation Procedures:   Hyperventilation was not performed.    Photic stimulation was not performed.    Artifacts:  Intermittent myogenic and movement artifacts were noted. Patient removed leads at 18:87    ECG:  The heart rate on single channel ECG was predominantly between 55-65  BPM.    EEG Summary / Classification:  Abnormal EEG in a lethargic patient.  - Moderate generalized slowing     EEG Impression / Clinical Correlate:    Abnormal EEG in a lethargic patient.  1.	Moderate non-specific diffuse or multifocal cerebral dysfunction    2.	There were no epileptiform abnormalities recorded.        Guerda Owens MD  ________________________________________  Neurophysiology/Epilepsy Fellow, [facility] [place]      Deepak Blackwell MD  Attending Physician, [facility] [place]

## 2018-03-11 NOTE — PROGRESS NOTE ADULT - SUBJECTIVE AND OBJECTIVE BOX
INTERVAL HPI/OVERNIGHT EVENTS:    Patient is sleeping with intermittent myoclonus. Does arouse however no meaningful verbal output or following of  commands    MEDICATIONS  (STANDING):  amLODIPine   Tablet 5 milliGRAM(s) Oral daily  aspirin  chewable 81 milliGRAM(s) Oral daily  FLUoxetine 10 milliGRAM(s) Oral daily  heparin  Injectable 5000 Unit(s) SubCutaneous every 12 hours  influenza   Vaccine 0.5 milliLiter(s) IntraMuscular once  levETIRAcetam  IVPB 500 milliGRAM(s) IV Intermittent every 12 hours  memantine 10 milliGRAM(s) Oral two times a day  nystatin Powder 1 Application(s) Topical two times a day  sodium chloride 0.9%. 1000 milliLiter(s) (50 mL/Hr) IV Continuous <Continuous>    MEDICATIONS  (PRN):      Allergies    No Known Allergies    Intolerances        Vital Signs Last 24 Hrs  T(C): 36.5 (10 Mar 2018 10:34), Max: 36.8 (09 Mar 2018 20:47)  T(F): 97.7 (10 Mar 2018 10:34), Max: 98.3 (09 Mar 2018 20:47)  HR: 50 (10 Mar 2018 10:34) (50 - 66)  BP: 142/83 (10 Mar 2018 10:34) (127/78 - 158/80)  BP(mean): --  RR: 18 (10 Mar 2018 10:34) (18 - 18)  SpO2: 97% (10 Mar 2018 10:34) (94% - 97%)    Neurological Exam:  Awakens to noxious stimuli.  No purposeful movements. independent segmental myoclonus.  Pupils reactive. Corneals Intact.  Moves all extremities to noxious stimuli.     Labs:     CBC Full  -  ( 10 Mar 2018 05:04 )  WBC Count : 5.4 K/uL  Hemoglobin : 14.4 g/dL  Hematocrit : 40.7 %  Platelet Count - Automated : 164 K/uL  Mean Cell Volume : 96.7 fl  Mean Cell Hemoglobin : 34.3 pg  Mean Cell Hemoglobin Concentration : 35.4 gm/dL  Auto Neutrophil # : x  Auto Lymphocyte # : x  Auto Monocyte # : x  Auto Eosinophil # : x  Auto Basophil # : x  Auto Neutrophil % : x  Auto Lymphocyte % : x  Auto Monocyte % : x  Auto Eosinophil % : x  Auto Basophil % : x    03-10    144  |  106  |  21  ----------------------------<  90  3.8   |  28  |  0.77    Ca    9.7      10 Mar 2018 05:04  Phos  2.9     03-  Mg     1.7     03-    TPro  7.1  /  Alb  3.8  /  TBili  2.2<H>  /  DBili  x   /  AST  18  /  ALT  22  /  AlkPhos  40  03-09    LIVER FUNCTIONS - ( 09 Mar 2018 11:44 )  Alb: 3.8 g/dL / Pro: 7.1 g/dL / ALK PHOS: 40 U/L / ALT: 22 U/L RC / AST: 18 U/L / GGT: x           PT/INR - ( 10 Mar 2018 09:29 )   PT: 12.6 sec;   INR: 1.15 ratio         PTT - ( 10 Mar 2018 09:29 )  PTT:28.1 sec  Urinalysis Basic - ( 09 Mar 2018 11:30 )    Color: Yellow / Appearance: Clear / S.022 / pH: x  Gluc: x / Ketone: Negative  / Bili: Negative / Urobili: Negative   Blood: x / Protein: Trace / Nitrite: Negative   Leuk Esterase: Negative / RBC: x / WBC x   Sq Epi: x / Non Sq Epi: x / Bacteria: x        Radiology:
Patient is a 67y old  Male who presents with a chief complaint of Confusion (03 Mar 2018 19:53)      SUBJECTIVE / OVERNIGHT EVENTS:  Lethargic, afebrile.  Telemetry with bradycardia but no symptoms reported.  Review of Systems:   CONSTITUTIONAL: No fever, weight loss, or fatigue  EYES: No eye pain, visual disturbances, or discharge  ENMT:  No difficulty hearing, tinnitus, vertigo; No sinus or throat pain  NECK: No pain or stiffness  BREASTS: No pain, masses, or nipple discharge  RESPIRATORY: No cough, wheezing, chills or hemoptysis; No shortness of breath  CARDIOVASCULAR: No chest pain, palpitations, dizziness, or leg swelling  GASTROINTESTINAL: No abdominal or epigastric pain. No nausea, vomiting, or hematemesis; No diarrhea or constipation. No melena or hematochezia.  GENITOURINARY: No dysuria, frequency, hematuria, or incontinence  NEUROLOGICAL: No headaches, memory loss, loss of strength, numbness, or tremors  SKIN: No itching, burning, rashes, or lesions   LYMPH NODES: No enlarged glands  ENDOCRINE: No heat or cold intolerance; No hair loss  MUSCULOSKELETAL: No joint pain or swelling; No muscle, back, or extremity pain  PSYCHIATRIC: No depression, anxiety, mood swings, or difficulty sleeping  HEME/LYMPH: No easy bruising, or bleeding gums  ALLERY AND IMMUNOLOGIC: No hives or eczema    MEDICATIONS  (STANDING):  aspirin  chewable 81 milliGRAM(s) Oral daily  donepezil 10 milliGRAM(s) Oral at bedtime  FLUoxetine 10 milliGRAM(s) Oral daily  heparin  Injectable 5000 Unit(s) SubCutaneous every 12 hours  influenza   Vaccine 0.5 milliLiter(s) IntraMuscular once  memantine 10 milliGRAM(s) Oral two times a day    MEDICATIONS  (PRN):      PHYSICAL EXAM:  Vital Signs Last 24 Hrs  T(C): 37.3 (05 Mar 2018 13:28), Max: 37.3 (05 Mar 2018 13:28)  T(F): 99.1 (05 Mar 2018 13:28), Max: 99.1 (05 Mar 2018 13:28)  HR: 56 (05 Mar 2018 13:28) (51 - 57)  BP: 147/86 (05 Mar 2018 13:28) (138/94 - 149/86)  BP(mean): --  RR: 18 (05 Mar 2018 13:28) (18 - 18)  SpO2: 95% (05 Mar 2018 13:28) (95% - 98%)  I&O's Summary    04 Mar 2018 07:01  -  05 Mar 2018 07:00  --------------------------------------------------------  IN: 1660 mL / OUT: 1150 mL / NET: 510 mL    05 Mar 2018 07:01  -  05 Mar 2018 18:38  --------------------------------------------------------  IN: 120 mL / OUT: 0 mL / NET: 120 mL      GENERAL: NAD, well-developed  HEAD:  Atraumatic, Normocephalic  EYES: EOMI, PERRLA, conjunctiva and sclera clear  NECK: Supple, No JVD  CHEST/LUNG: Clear to auscultation bilaterally; No wheeze  HEART: Regular rate and rhythm; No murmurs, rubs, or gallops  ABDOMEN: Soft, Nontender, Nondistended; Bowel sounds present  EXTREMITIES:  2+ Peripheral Pulses, No clubbing, cyanosis, or edema  PSYCH:Confused and lethargic.  NEUROLOGY: non-focal  SKIN: No rashes or lesions    LABS:  CAPILLARY BLOOD GLUCOSE                              12.9   4.0   )-----------( 171      ( 04 Mar 2018 06:55 )             36.9     03-05    144  |  105  |  11  ----------------------------<  96  3.4<L>   |  28  |  0.71    Ca    9.5      05 Mar 2018 05:36  Mg     1.7     03-05                RADIOLOGY & ADDITIONAL TESTS:    Imaging Personally Reviewed:    Consultant(s) Notes Reviewed:      Care Discussed with Consultants/Other Providers:
Electrophysiology Progress Note    New EP consults: [phone]  EP NP Spectra: [phone]  EP Fellow Spectra: [phone]    Chief complaint: bradycardia and AMS    Interval Events:  PAT overnight. unknown if symptomatic as pt is poor historian. No current complaints.    MEDICATIONS:  aspirin  chewable 81 milliGRAM(s) Oral daily  heparin  Injectable 5000 Unit(s) SubCutaneous every 12 hours  donepezil 10 milliGRAM(s) Oral at bedtime  FLUoxetine 10 milliGRAM(s) Oral daily  memantine 10 milliGRAM(s) Oral two times a day  influenza   Vaccine 0.5 milliLiter(s) IntraMuscular once    PHYSICAL EXAM:  T(C): 36.3 (03-05-18 @ 04:34), Max: 36.9 (03-04-18 @ 21:18)  HR: 51 (03-05-18 @ 04:34) (46 - 57)  BP: 149/86 (03-05-18 @ 04:34) (138/94 - 149/86)  RR: 18 (03-05-18 @ 04:34) (18 - 18)  SpO2: 98% (03-05-18 @ 04:34) (97% - 99%)  Wt(kg): --  I&O's Summary    04 Mar 2018 07:01  -  05 Mar 2018 07:00  --------------------------------------------------------  IN: 1660 mL / OUT: 1150 mL / NET: 510 mL    Appearance: No distress  HEENT:  mmm	  Cardiovascular: Normal S1 S2, no LE edema  Respiratory: Lungs clear to auscultation	  Psychiatry: A & O x 1  Gastrointestinal:  soft nt nd  Skin: No cyanosis	  Neurologic: pt is disoriented  Extremities:  No cyanosis  Vascular: Peripheral pulses palpable bilaterally    LABS:	 	    CBC Full  -  ( 04 Mar 2018 06:55 )  WBC Count : 4.0 K/uL  Hemoglobin : 12.9 g/dL  Hematocrit : 36.9 %  Platelet Count - Automated : 171 K/uL    03-05  144  |  105  |  11  ----------------------------<  96  3.4<L>   |  28  |  0.71    03-04  143  |  104  |  10  ----------------------------<  85  3.3<L>   |  30  |  0.81    Ca    9.5      05 Mar 2018 05:36  Ca    9.3      04 Mar 2018 06:55  Mg     1.7     03-05  Mg     1.8     03-03    TPro  6.9  /  Alb  4.1  /  TBili  1.8<H>  /  DBili  x   /  AST  34  /  ALT  33  /  AlkPhos  36<L>  03-03    TSH: Thyroid Stimulating Hormone, Serum: 2.74 uIU/mL (03-05 @ 06:43)    TELEMETRY: 	  SR 50-60, PAT
INTERVAL HPI/OVERNIGHT EVENTS:        MEDICATIONS  (STANDING):  amLODIPine   Tablet 5 milliGRAM(s) Oral daily  aspirin  chewable 81 milliGRAM(s) Oral daily  FLUoxetine 10 milliGRAM(s) Oral daily  heparin  Injectable 5000 Unit(s) SubCutaneous every 12 hours  influenza   Vaccine 0.5 milliLiter(s) IntraMuscular once  memantine 10 milliGRAM(s) Oral two times a day  sodium chloride 0.9%. 1000 milliLiter(s) (50 mL/Hr) IV Continuous <Continuous>    MEDICATIONS  (PRN):      Allergies    No Known Allergies    Intolerances        Vital Signs Last 24 Hrs  T(C): 37.1 (09 Mar 2018 05:28), Max: 37.6 (08 Mar 2018 13:25)  T(F): 98.7 (09 Mar 2018 05:28), Max: 99.6 (08 Mar 2018 13:25)  HR: 58 (09 Mar 2018 09:34) (58 - 76)  BP: 132/78 (09 Mar 2018 09:34) (132/78 - 175/91)  BP(mean): --  RR: 18 (09 Mar 2018 05:28) (18 - 18)  SpO2: 95% (09 Mar 2018 05:28) (95% - 98%)    Neurological Exam:  Mental Status: AAOx0, no significant speech production, not following commands    While asleep there was independent multisegment myoclonic activity, positive tremor    Cranial Nerves: no overt facial asymmetry, gaze deviation,     Motor: Patient was moving extremities equally    Sensation:       Reflexes: frontal release signs +Snout +gagenhalten       Labs:     CBC Full  -  ( 08 Mar 2018 10:05 )  WBC Count : 8.0 K/uL  Hemoglobin : 14.9 g/dL  Hematocrit : 41.7 %  Platelet Count - Automated : 180 K/uL  Mean Cell Volume : 96.4 fl  Mean Cell Hemoglobin : 34.5 pg  Mean Cell Hemoglobin Concentration : 35.8 gm/dL  Auto Neutrophil # : x  Auto Lymphocyte # : x  Auto Monocyte # : x  Auto Eosinophil # : x  Auto Basophil # : x  Auto Neutrophil % : x  Auto Lymphocyte % : x  Auto Monocyte % : x  Auto Eosinophil % : x  Auto Basophil % : x    03-08    143  |  105  |  20  ----------------------------<  129<H>  3.9   |  30  |  0.75    Ca    9.9      08 Mar 2018 10:05              Radiology:
INTERVAL HPI/OVERNIGHT EVENTS:    Family reports increasing myoclonus    MEDICATIONS  (STANDING):  amLODIPine   Tablet 5 milliGRAM(s) Oral daily  aspirin  chewable 81 milliGRAM(s) Oral daily  Biotene Dry Mouth Oral Rinse 5 milliLiter(s) Swish and Spit two times a day  FLUoxetine 10 milliGRAM(s) Oral daily  heparin  Injectable 5000 Unit(s) SubCutaneous every 12 hours  influenza   Vaccine 0.5 milliLiter(s) IntraMuscular once  levETIRAcetam  IVPB 500 milliGRAM(s) IV Intermittent every 12 hours  memantine 10 milliGRAM(s) Oral two times a day  nystatin Powder 1 Application(s) Topical two times a day  sodium chloride 0.9%. 1000 milliLiter(s) (50 mL/Hr) IV Continuous <Continuous>    MEDICATIONS  (PRN):  LORazepam   Injectable 0.5 milliGRAM(s) IV Push every 12 hours PRN Agitation      Allergies    No Known Allergies    Intolerances        Vital Signs Last 24 Hrs  T(C): 36.9 (11 Mar 2018 03:04), Max: 36.9 (10 Mar 2018 17:41)  T(F): 98.4 (11 Mar 2018 03:04), Max: 98.4 (10 Mar 2018 17:41)  HR: 139 (11 Mar 2018 05:38) (48 - 139)  BP: 127/84 (11 Mar 2018 05:38) (127/84 - 174/79)  BP(mean): --  RR: 18 (11 Mar 2018 03:04) (17 - 18)  SpO2: 96% (11 Mar 2018 03:04) (96% - 100%)    Neurological Exam:  Patient is asleep.  Not arousable.  No verbal output.  Not following commands  +Myoclonus  Cranial reflexes intact.    Labs:     CBC Full  -  ( 10 Mar 2018 05:04 )  WBC Count : 5.4 K/uL  Hemoglobin : 14.4 g/dL  Hematocrit : 40.7 %  Platelet Count - Automated : 164 K/uL  Mean Cell Volume : 96.7 fl  Mean Cell Hemoglobin : 34.3 pg  Mean Cell Hemoglobin Concentration : 35.4 gm/dL  Auto Neutrophil # : x  Auto Lymphocyte # : x  Auto Monocyte # : x  Auto Eosinophil # : x  Auto Basophil # : x  Auto Neutrophil % : x  Auto Lymphocyte % : x  Auto Monocyte % : x  Auto Eosinophil % : x  Auto Basophil % : x    03-11    143  |  104  |  16  ----------------------------<  103<H>  3.4<L>   |  27  |  0.62    Ca    9.9      11 Mar 2018 05:59  Phos  2.9     -  Mg     1.7         TPro  7.1  /  Alb  3.8  /  TBili  2.2<H>  /  DBili  x   /  AST  18  /  ALT  22  /  AlkPhos  40  -    LIVER FUNCTIONS - ( 09 Mar 2018 11:44 )  Alb: 3.8 g/dL / Pro: 7.1 g/dL / ALK PHOS: 40 U/L / ALT: 22 U/L RC / AST: 18 U/L / GGT: x           PT/INR - ( 10 Mar 2018 09:29 )   PT: 12.6 sec;   INR: 1.15 ratio         PTT - ( 10 Mar 2018 09:29 )  PTT:28.1 sec  Urinalysis Basic - ( 09 Mar 2018 11:30 )    Color: Yellow / Appearance: Clear / S.022 / pH: x  Gluc: x / Ketone: Negative  / Bili: Negative / Urobili: Negative   Blood: x / Protein: Trace / Nitrite: Negative   Leuk Esterase: Negative / RBC: x / WBC x   Sq Epi: x / Non Sq Epi: x / Bacteria: x        Radiology:
Patient is a 67y old  Male who presents with a chief complaint of Confusion (03 Mar 2018 19:53)      SUBJECTIVE / OVERNIGHT EVENTS:  No worsening in mental status. Tele records sinus bradycardia 40-90 with APCs and PVCs.  Review of Systems:   CONSTITUTIONAL: No fever, weight loss, or fatigue  EYES: No eye pain, visual disturbances, or discharge  ENMT:  No difficulty hearing, tinnitus, vertigo; No sinus or throat pain  NECK: No pain or stiffness  BREASTS: No pain, masses, or nipple discharge  RESPIRATORY: No cough, wheezing, chills or hemoptysis; No shortness of breath  CARDIOVASCULAR: No chest pain, palpitations, dizziness, or leg swelling  GASTROINTESTINAL: No abdominal or epigastric pain. No nausea, vomiting, or hematemesis; No diarrhea or constipation. No melena or hematochezia.  GENITOURINARY: No dysuria, frequency, hematuria, or incontinence  NEUROLOGICAL: No headaches, memory loss, loss of strength, numbness, or tremors  SKIN: No itching, burning, rashes, or lesions   LYMPH NODES: No enlarged glands  ENDOCRINE: No heat or cold intolerance; No hair loss  MUSCULOSKELETAL: No joint pain or swelling; No muscle, back, or extremity pain  PSYCHIATRIC: No depression, anxiety, mood swings, or difficulty sleeping  HEME/LYMPH: No easy bruising, or bleeding gums  ALLERY AND IMMUNOLOGIC: No hives or eczema    MEDICATIONS  (STANDING):  aspirin  chewable 81 milliGRAM(s) Oral daily  donepezil 10 milliGRAM(s) Oral at bedtime  FLUoxetine 10 milliGRAM(s) Oral daily  heparin  Injectable 5000 Unit(s) SubCutaneous every 12 hours  influenza   Vaccine 0.5 milliLiter(s) IntraMuscular once  memantine 10 milliGRAM(s) Oral two times a day    MEDICATIONS  (PRN):      PHYSICAL EXAM:  Vital Signs Last 24 Hrs  T(C): 36.3 (04 Mar 2018 12:10), Max: 36.8 (03 Mar 2018 18:07)  T(F): 97.4 (04 Mar 2018 12:10), Max: 98.2 (03 Mar 2018 18:07)  HR: 46 (04 Mar 2018 12:10) (42 - 54)  BP: 147/84 (04 Mar 2018 12:10) (143/68 - 175/84)  BP(mean): --  RR: 18 (04 Mar 2018 12:10) (17 - 18)  SpO2: 99% (04 Mar 2018 12:10) (94% - 100%)  I&O's Summary    03 Mar 2018 07:01  -  04 Mar 2018 07:00  --------------------------------------------------------  IN: 240 mL / OUT: 3 mL / NET: 237 mL    04 Mar 2018 07:01  -  04 Mar 2018 14:18  --------------------------------------------------------  IN: 960 mL / OUT: 25 mL / NET: 935 mL      GENERAL: NAD, well-developed  HEAD:  Atraumatic, Normocephalic  EYES: EOMI, PERRLA, conjunctiva and sclera clear  NECK: Supple, No JVD  CHEST/LUNG: Clear to auscultation bilaterally; No wheeze  HEART: Regular rate and rhythm; No murmurs, rubs, or gallops  ABDOMEN: Soft, Nontender, Nondistended; Bowel sounds present  EXTREMITIES:  2+ Peripheral Pulses, No clubbing, cyanosis, or edema  PSYCH: AAOx3  NEUROLOGY: Confused, does not report any problems  SKIN: No rashes or lesions    LABS:  CAPILLARY BLOOD GLUCOSE                              12.9   4.0   )-----------( 171      ( 04 Mar 2018 06:55 )             36.9     -    143  |  104  |  10  ----------------------------<  85  3.3<L>   |  30  |  0.81    Ca    9.3      04 Mar 2018 06:55  Mg     1.8     -    TPro  6.9  /  Alb  4.1  /  TBili  1.8<H>  /  DBili  x   /  AST  34  /  ALT  33  /  AlkPhos  36<L>  03-03          Urinalysis Basic - ( 03 Mar 2018 14:50 )    Color: PL Yellow / Appearance: Clear / S.007 / pH: x  Gluc: x / Ketone: Negative  / Bili: Negative / Urobili: Negative   Blood: x / Protein: Negative / Nitrite: Negative   Leuk Esterase: Small / RBC: 0-2 /HPF / WBC 3-5 /HPF   Sq Epi: x / Non Sq Epi: OCC /HPF / Bacteria: Few /HPF        RADIOLOGY & ADDITIONAL TESTS:    Imaging Personally Reviewed:    Consultant(s) Notes Reviewed:      Care Discussed with Consultants/Other Providers:
Patient is a 67y old  Male who presents with a chief complaint of Confusion (07 Mar 2018 13:33)      SUBJECTIVE / OVERNIGHT EVENTS:  Agitated, non verbal  Review of Systems:   MEDICATIONS  (STANDING):  amLODIPine   Tablet 5 milliGRAM(s) Oral daily  aspirin  chewable 81 milliGRAM(s) Oral daily  heparin  Injectable 5000 Unit(s) SubCutaneous every 12 hours  influenza   Vaccine 0.5 milliLiter(s) IntraMuscular once  LORazepam   Injectable 0.5 milliGRAM(s) IV Push every 8 hours  nystatin Powder 1 Application(s) Topical two times a day  potassium chloride    Tablet ER 40 milliEquivalent(s) Oral once  saliva substitute (BIOTENE MOISTURIZING MOUTH SPRAY) 1 [place]ley(s) Topical two times a day  sodium chloride 0.9%. 1000 milliLiter(s) (50 mL/Hr) IV Continuous <Continuous>    MEDICATIONS  (PRN):      PHYSICAL EXAM:  Vital Signs Last 24 Hrs  T(C): 36.9 (11 Mar 2018 03:04), Max: 36.9 (10 Mar 2018 17:41)  T(F): 98.4 (11 Mar 2018 03:04), Max: 98.4 (10 Mar 2018 17:41)  HR: 139 (11 Mar 2018 05:38) (64 - 139)  BP: 127/84 (11 Mar 2018 05:38) (127/84 - 174/79)  BP(mean): --  RR: 18 (11 Mar 2018 03:04) (17 - 18)  SpO2: 96% (11 Mar 2018 03:04) (96% - 100%)  I&O's Summary    10 Mar 2018 06:01  -  11 Mar 2018 07:00  --------------------------------------------------------  IN: 1350 mL / OUT: 0 mL / NET: 1350 mL      GENERAL: NAD, well-developed  HEAD:  Atraumatic, Normocephalic  EYES: EOMI, PERRLA, conjunctiva and sclera clear  NECK: Supple, No JVD  CHEST/LUNG: Clear to auscultation bilaterally; No wheeze  HEART: Regular rate and rhythm; No murmurs, rubs, or gallops  ABDOMEN: Soft, Nontender, Nondistended; Bowel sounds present  EXTREMITIES:  2+ Peripheral Pulses, No clubbing, cyanosis, or edema  PSYCH: non verbal, moves all extremities  SKIN: No rashes or lesions    LABS:  CAPILLARY BLOOD GLUCOSE                              14.4   5.4   )-----------( 164      ( 10 Mar 2018 05:04 )             40.7     03-11    143  |  104  |  16  ----------------------------<  103<H>  3.4<L>   |  27  |  0.62    Ca    9.9      11 Mar 2018 05:59      PT/INR - ( 10 Mar 2018 09:29 )   PT: 12.6 sec;   INR: 1.15 ratio         PTT - ( 10 Mar 2018 09:29 )  PTT:28.1 sec          RADIOLOGY & ADDITIONAL TESTS:    Imaging Personally Reviewed:    Consultant(s) Notes Reviewed:      Care Discussed with Consultants/Other Providers:
Patient is a 67y old  Male who presents with a chief complaint of Confusion (07 Mar 2018 13:33)      SUBJECTIVE / OVERNIGHT EVENTS:  Involuntary jerking movements noted, he is lethargic but easily arousable  Review of Systems:   CONSTITUTIONAL: No fever, weight loss, or fatigue  EYES: No eye pain, visual disturbances, or discharge  ENMT:  No difficulty hearing, tinnitus, vertigo; No sinus or throat pain  NECK: No pain or stiffness  BREASTS: No pain, masses, or nipple discharge  RESPIRATORY: No cough, wheezing, chills or hemoptysis; No shortness of breath  CARDIOVASCULAR: No chest pain, palpitations, dizziness, or leg swelling  GASTROINTESTINAL: No abdominal or epigastric pain. No nausea, vomiting, or hematemesis; No diarrhea or constipation. No melena or hematochezia.  GENITOURINARY: No dysuria, frequency, hematuria, or incontinence  NEUROLOGICAL:Non verbal  LYMPH NODES: No enlarged glands  ENDOCRINE: No heat or cold intolerance; No hair loss  MUSCULOSKELETAL: No joint pain or swelling; No muscle, back, or extremity pain  PSYCHIATRIC: No depression, anxiety, mood swings, or difficulty sleeping  HEME/LYMPH: No easy bruising, or bleeding gums  ALLERY AND IMMUNOLOGIC: No hives or eczema    MEDICATIONS  (STANDING):  amLODIPine   Tablet 5 milliGRAM(s) Oral daily  aspirin  chewable 81 milliGRAM(s) Oral daily  FLUoxetine 10 milliGRAM(s) Oral daily  heparin  Injectable 5000 Unit(s) SubCutaneous every 12 hours  influenza   Vaccine 0.5 milliLiter(s) IntraMuscular once  memantine 10 milliGRAM(s) Oral two times a day  sodium chloride 0.9%. 1000 milliLiter(s) (50 mL/Hr) IV Continuous <Continuous>    MEDICATIONS  (PRN):      PHYSICAL EXAM:  Vital Signs Last 24 Hrs  T(C): 37.1 (09 Mar 2018 05:28), Max: 37.6 (08 Mar 2018 13:25)  T(F): 98.7 (09 Mar 2018 05:28), Max: 99.6 (08 Mar 2018 13:25)  HR: 58 (09 Mar 2018 09:34) (58 - 76)  BP: 132/78 (09 Mar 2018 09:34) (132/78 - 175/91)  BP(mean): --  RR: 18 (09 Mar 2018 05:28) (18 - 18)  SpO2: 95% (09 Mar 2018 05:28) (95% - 98%)  I&O's Summary    08 Mar 2018 07:01  -  09 Mar 2018 07:00  --------------------------------------------------------  IN: 1070 mL / OUT: 0 mL / NET: 1070 mL      GENERAL: NAD, well-developed  HEAD:  Atraumatic, Normocephalic  EYES: EOMI, PERRLA, conjunctiva and sclera clear  NECK: Supple, No JVD  CHEST/LUNG: Clear to auscultation bilaterally; No wheeze  HEART: Regular rate and rhythm; No murmurs, rubs, or gallops  ABDOMEN: Soft, Nontender, Nondistended; Bowel sounds present  EXTREMITIES:  2+ Peripheral Pulses, No clubbing, cyanosis, or edema  PSYCH: Non verbal  NEUROLOGY: Genralized jerky movements noted,   SKIN: No rashes or lesions    LABS:  CAPILLARY BLOOD GLUCOSE                              14.9   8.0   )-----------( 180      ( 08 Mar 2018 10:05 )             41.7     03-08    143  |  105  |  20  ----------------------------<  129<H>  3.9   |  30  |  0.75    Ca    9.9      08 Mar 2018 10:05                RADIOLOGY & ADDITIONAL TESTS:    Imaging Personally Reviewed:    Consultant(s) Notes Reviewed:      Care Discussed with Consultants/Other Providers:
Patient is a 67y old  Male who presents with a chief complaint of Confusion (07 Mar 2018 13:33)      SUBJECTIVE / OVERNIGHT EVENTS:  More awake today  EEG in progress  Review of Systems:   Non verbal    MEDICATIONS  (STANDING):  amLODIPine   Tablet 5 milliGRAM(s) Oral daily  aspirin  chewable 81 milliGRAM(s) Oral daily  FLUoxetine 10 milliGRAM(s) Oral daily  heparin  Injectable 5000 Unit(s) SubCutaneous every 12 hours  influenza   Vaccine 0.5 milliLiter(s) IntraMuscular once  levETIRAcetam  IVPB 500 milliGRAM(s) IV Intermittent every 12 hours  memantine 10 milliGRAM(s) Oral two times a day  nystatin Powder 1 Application(s) Topical two times a day  sodium chloride 0.9%. 1000 milliLiter(s) (50 mL/Hr) IV Continuous <Continuous>    MEDICATIONS  (PRN):      PHYSICAL EXAM:  Vital Signs Last 24 Hrs  T(C): 36.9 (10 Mar 2018 17:41), Max: 36.9 (10 Mar 2018 17:41)  T(F): 98.4 (10 Mar 2018 17:41), Max: 98.4 (10 Mar 2018 17:41)  HR: 76 (10 Mar 2018 17:41) (48 - 76)  BP: 174/79 (10 Mar 2018 17:41) (127/78 - 174/79)  BP(mean): --  RR: 17 (10 Mar 2018 17:41) (17 - 18)  SpO2: 97% (10 Mar 2018 17:41) (95% - 98%)  I&O's Summary    09 Mar 2018 07:  -  10 Mar 2018 07:00  --------------------------------------------------------  IN: 1590 mL / OUT: 300 mL / NET: 1290 mL    10 Mar 2018 06:01  -  10 Mar 2018 18:31  --------------------------------------------------------  IN: 0 mL / OUT: 0 mL / NET: 0 mL      GENERAL: NAD, well-developed  HEAD:  Atraumatic, Normocephalic  EYES: EOMI, PERRLA, conjunctiva and sclera clear  NECK: Supple, No JVD  CHEST/LUNG: Clear to auscultation bilaterally; No wheeze  HEART: Regular rate and rhythm; No murmurs, rubs, or gallops  ABDOMEN: Soft, Nontender, Nondistended; Bowel sounds present  EXTREMITIES:  2+ Peripheral Pulses, No clubbing, cyanosis, or edema  PSYCH: Non verbal, confused  NEUROLOGY: non-focal  SKIN: No rashes or lesions    LABS:  CAPILLARY BLOOD GLUCOSE                              14.4   5.4   )-----------( 164      ( 10 Mar 2018 05:04 )             40.7     03-10    144  |  106  |  21  ----------------------------<  90  3.8   |  28  |  0.77    Ca    9.7      10 Mar 2018 05:04  Phos  2.9     03-09  Mg     1.7     03-09    TPro  7.1  /  Alb  3.8  /  TBili  2.2<H>  /  DBili  x   /  AST  18  /  ALT  22  /  AlkPhos  40  03-09    PT/INR - ( 10 Mar 2018 09:29 )   PT: 12.6 sec;   INR: 1.15 ratio         PTT - ( 10 Mar 2018 09:29 )  PTT:28.1 sec      Urinalysis Basic - ( 09 Mar 2018 11:30 )    Color: Yellow / Appearance: Clear / S.022 / pH: x  Gluc: x / Ketone: Negative  / Bili: Negative / Urobili: Negative   Blood: x / Protein: Trace / Nitrite: Negative   Leuk Esterase: Negative / RBC: x / WBC x   Sq Epi: x / Non Sq Epi: x / Bacteria: x        RADIOLOGY & ADDITIONAL TESTS:    Imaging Personally Reviewed:    Consultant(s) Notes Reviewed:      Care Discussed with Consultants/Other Providers:
Patient is a 67y old  Male who presents with a chief complaint of Confusion (07 Mar 2018 13:33)      SUBJECTIVE / OVERNIGHT EVENTS:  Very lethargic, Hardly responds to verbal Stimuli.  This is new  Review of Systems:   CONSTITUTIONAL: No fever, weight loss,   EYES: No eye pain, visual disturbances, or discharge  ENMT:  No difficulty hearing, tinnitus, vertigo; No sinus or throat pain  NECK: No pain or stiffness  BREASTS: No pain, masses, or nipple discharge  RESPIRATORY: No cough, wheezing, chills or hemoptysis; No shortness of breath  CARDIOVASCULAR: No chest pain, palpitations, dizziness, or leg swelling  GASTROINTESTINAL: No abdominal or epigastric pain. No nausea, vomiting, or hematemesis; No diarrhea or constipation. No melena or hematochezia.  GENITOURINARY: No dysuria, frequency, hematuria, or incontinence  NEUROLOGICAL: No headaches, memory loss, loss of strength, numbness, or tremors  SKIN: No itching, burning, rashes, or lesions   LYMPH NODES: No enlarged glands  ENDOCRINE: No heat or cold intolerance; No hair loss  MUSCULOSKELETAL: No joint pain or swelling; No muscle, back, or extremity pain  PSYCHIATRIC: No depression, anxiety, mood swings, or difficulty sleeping  HEME/LYMPH: No easy bruising, or bleeding gums  ALLERY AND IMMUNOLOGIC: No hives or eczema    MEDICATIONS  (STANDING):  amLODIPine   Tablet 5 milliGRAM(s) Oral daily  aspirin  chewable 81 milliGRAM(s) Oral daily  FLUoxetine 10 milliGRAM(s) Oral daily  heparin  Injectable 5000 Unit(s) SubCutaneous every 12 hours  influenza   Vaccine 0.5 milliLiter(s) IntraMuscular once  memantine 10 milliGRAM(s) Oral two times a day  sodium chloride 0.9%. 1000 milliLiter(s) (50 mL/Hr) IV Continuous <Continuous>    MEDICATIONS  (PRN):      PHYSICAL EXAM:  Vital Signs Last 24 Hrs  T(C): 37.6 (08 Mar 2018 13:25), Max: 37.6 (08 Mar 2018 13:25)  T(F): 99.6 (08 Mar 2018 13:25), Max: 99.6 (08 Mar 2018 13:25)  HR: 60 (08 Mar 2018 13:25) (60 - 80)  BP: 137/77 (08 Mar 2018 13:25) (130/87 - 162/87)  BP(mean): --  RR: 18 (08 Mar 2018 13:25) (17 - 18)  SpO2: 98% (08 Mar 2018 13:25) (97% - 98%)  I&O's Summary    07 Mar 2018 07:01  -  08 Mar 2018 07:00  --------------------------------------------------------  IN: 1260 mL / OUT: 0 mL / NET: 1260 mL    08 Mar 2018 07:01  -  08 Mar 2018 18:34  --------------------------------------------------------  IN: 220 mL / OUT: 0 mL / NET: 220 mL      GENERAL: NAD, well-developed  HEAD:  Atraumatic, Normocephalic  EYES: EOMI, PERRLA, conjunctiva and sclera clear  NECK: Supple, No JVD  CHEST/LUNG: Clear to auscultation bilaterally; No wheeze  HEART: Regular rate and rhythm; No murmurs, rubs, or gallops  ABDOMEN: Soft, Nontender, Nondistended; Bowel sounds present  EXTREMITIES:  2+ Peripheral Pulses, No clubbing, cyanosis, or edema  PSYCH: Nonverbal  NEUROLOGY:Nonverbal  SKIN: No rashes or lesions    LABS:  CAPILLARY BLOOD GLUCOSE      POCT Blood Glucose.: 125 mg/dL (08 Mar 2018 10:04)                          14.9   8.0   )-----------( 180      ( 08 Mar 2018 10:05 )             41.7     03-08    143  |  105  |  20  ----------------------------<  129<H>  3.9   |  30  |  0.75    Ca    9.9      08 Mar 2018 10:05                RADIOLOGY & ADDITIONAL TESTS:    Imaging Personally Reviewed:    Consultant(s) Notes Reviewed:      Care Discussed with Consultants/Other Providers:
Patient is a 67y old  Male who presents with a chief complaint of Confusion (03 Mar 2018 19:53)      SUBJECTIVE / OVERNIGHT EVENTS:  No new symptoms. Tele with some bradycardia persists  Review of Systems:   CONSTITUTIONAL: No fever, weight loss, + Fatigue  EYES: No eye pain, visual disturbances, or discharge  ENMT:  No difficulty hearing, tinnitus, vertigo; No sinus or throat pain  NECK: No pain or stiffness  BREASTS: No pain, masses, or nipple discharge  RESPIRATORY: No cough, wheezing, chills or hemoptysis; No shortness of breath  CARDIOVASCULAR: No chest pain, palpitations, dizziness, or leg swelling  GASTROINTESTINAL: No abdominal or epigastric pain. No nausea, vomiting, or hematemesis; No diarrhea or constipation. No melena or hematochezia.  GENITOURINARY: No dysuria, frequency, hematuria, or incontinence  NEUROLOGICAL: No headaches, memory loss, loss of strength, numbness, or tremors  SKIN: No itching, burning, rashes, or lesions   LYMPH NODES: No enlarged glands  ENDOCRINE: No heat or cold intolerance; No hair loss  MUSCULOSKELETAL: No joint pain or swelling; No muscle, back, or extremity pain  PSYCHIATRIC: No depression, anxiety, mood swings, or difficulty sleeping  HEME/LYMPH: No easy bruising, or bleeding gums  ALLERY AND IMMUNOLOGIC: No hives or eczema    MEDICATIONS  (STANDING):  aspirin  chewable 81 milliGRAM(s) Oral daily  FLUoxetine 10 milliGRAM(s) Oral daily  heparin  Injectable 5000 Unit(s) SubCutaneous every 12 hours  influenza   Vaccine 0.5 milliLiter(s) IntraMuscular once  memantine 10 milliGRAM(s) Oral two times a day    MEDICATIONS  (PRN):      PHYSICAL EXAM:  Vital Signs Last 24 Hrs  T(C): 36.8 (06 Mar 2018 12:24), Max: 36.8 (06 Mar 2018 04:29)  T(F): 98.3 (06 Mar 2018 12:24), Max: 98.3 (06 Mar 2018 12:24)  HR: 59 (06 Mar 2018 12:24) (53 - 128)  BP: 136/88 (06 Mar 2018 12:24) (136/88 - 158/80)  BP(mean): --  RR: 17 (06 Mar 2018 12:24) (17 - 18)  SpO2: 100% (06 Mar 2018 12:24) (96% - 100%)  I&O's Summary    05 Mar 2018 07:01  -  06 Mar 2018 07:00  --------------------------------------------------------  IN: 320 mL / OUT: 0 mL / NET: 320 mL    06 Mar 2018 07:01  -  06 Mar 2018 17:16  --------------------------------------------------------  IN: 480 mL / OUT: 0 mL / NET: 480 mL      GENERAL: NAD, well-developed  HEAD:  Atraumatic, Normocephalic  EYES: EOMI, PERRLA, conjunctiva and sclera clear  NECK: Supple, No JVD  CHEST/LUNG: Clear to auscultation bilaterally; No wheeze  HEART: Regular rate and rhythm; No murmurs, rubs, or gallops  ABDOMEN: Soft, Nontender, Nondistended; Bowel sounds present  EXTREMITIES:  2+ Peripheral Pulses, No clubbing, cyanosis, or edema  PSYCH: Lethargic  NEUROLOGY: non-focal  SKIN: No rashes or lesions    LABS:  CAPILLARY BLOOD GLUCOSE          03-06    143  |  104  |  11  ----------------------------<  89  3.9   |  32<H>  |  0.74    Ca    9.8      06 Mar 2018 06:28  Mg     2.2     03-06                RADIOLOGY & ADDITIONAL TESTS:    Imaging Personally Reviewed:    Consultant(s) Notes Reviewed:      Care Discussed with Consultants/Other Providers:
Patient is a 67y old  Male who presents with a chief complaint of Confusion (03 Mar 2018 19:53)      SUBJECTIVE / OVERNIGHT EVENTS:  No new symptoms  Review of Systems:   CONSTITUTIONAL: No fever, weight loss, or fatigue  EYES: No eye pain, visual disturbances, or discharge  ENMT:  No difficulty hearing, tinnitus, vertigo; No sinus or throat pain  NECK: No pain or stiffness  BREASTS: No pain, masses, or nipple discharge  RESPIRATORY: No cough, wheezing, chills or hemoptysis; No shortness of breath  CARDIOVASCULAR: No chest pain, palpitations, dizziness, or leg swelling  GASTROINTESTINAL: No abdominal or epigastric pain. No nausea, vomiting, or hematemesis; No diarrhea or constipation. No melena or hematochezia.  GENITOURINARY: No dysuria, frequency, hematuria, or incontinence  NEUROLOGICAL: No headaches, memory loss, loss of strength, numbness, or tremors  SKIN: No itching, burning, rashes, or lesions   LYMPH NODES: No enlarged glands  ENDOCRINE: No heat or cold intolerance; No hair loss  MUSCULOSKELETAL: No joint pain or swelling; No muscle, back, or extremity pain  PSYCHIATRIC: No depression, anxiety, mood swings, or difficulty sleeping  HEME/LYMPH: No easy bruising, or bleeding gums  ALLERY AND IMMUNOLOGIC: No hives or eczema    MEDICATIONS  (STANDING):  amLODIPine   Tablet 5 milliGRAM(s) Oral daily  aspirin  chewable 81 milliGRAM(s) Oral daily  docusate sodium 100 milliGRAM(s) Oral two times a day  FLUoxetine 10 milliGRAM(s) Oral daily  heparin  Injectable 5000 Unit(s) SubCutaneous every 12 hours  influenza   Vaccine 0.5 milliLiter(s) IntraMuscular once  memantine 10 milliGRAM(s) Oral two times a day  senna 2 Tablet(s) Oral at bedtime    MEDICATIONS  (PRN):      PHYSICAL EXAM:  Vital Signs Last 24 Hrs  T(C): 36.9 (07 Mar 2018 12:16), Max: 36.9 (07 Mar 2018 12:16)  T(F): 98.4 (07 Mar 2018 12:16), Max: 98.4 (07 Mar 2018 12:16)  HR: 66 (07 Mar 2018 12:16) (58 - 66)  BP: 121/79 (07 Mar 2018 12:16) (121/79 - 177/73)  BP(mean): --  RR: 17 (07 Mar 2018 12:16) (17 - 18)  SpO2: 97% (07 Mar 2018 12:16) (97% - 98%)  I&O's Summary    06 Mar 2018 07:01  -  07 Mar 2018 07:00  --------------------------------------------------------  IN: 680 mL / OUT: 0 mL / NET: 680 mL    07 Mar 2018 07:01  -  07 Mar 2018 13:05  --------------------------------------------------------  IN: 360 mL / OUT: 0 mL / NET: 360 mL      GENERAL: NAD, well-developed  HEAD:  Atraumatic, Normocephalic  EYES: EOMI, PERRLA, conjunctiva and sclera clear  NECK: Supple, No JVD  CHEST/LUNG: Clear to auscultation bilaterally; No wheeze  HEART: Regular rate and rhythm; No murmurs, rubs, or gallops  ABDOMEN: Soft, Nontender, Nondistended; Bowel sounds present  EXTREMITIES:  2+ Peripheral Pulses, No clubbing, cyanosis, or edema  PSYCH: aphasic  NEUROLOGY: generalized weakness  SKIN: No rashes or lesions    LABS:  CAPILLARY BLOOD GLUCOSE                              14.4   5.6   )-----------( 179      ( 07 Mar 2018 06:23 )             41.5     03-07    143  |  104  |  14  ----------------------------<  92  3.9   |  28  |  0.72    Ca    9.8      07 Mar 2018 06:23  Mg     2.2     03-06                RADIOLOGY & ADDITIONAL TESTS:    Imaging Personally Reviewed:    Consultant(s) Notes Reviewed:      Care Discussed with Consultants/Other Providers:

## 2018-03-11 NOTE — PROVIDER CONTACT NOTE (OTHER) - ASSESSMENT
A&O x0.  Patient mostly lethargic and nonverbal.  /89, HR 66, temp 98.3, RR 18, 100% O2 saturation on room air.  Patient appears to be asymptomatic and no s/s of pain/discomfort/distress noted

## 2018-03-11 NOTE — SWALLOW BEDSIDE ASSESSMENT ADULT - SLP PRECAUTIONS/LIMITATIONS: HEARING
Adequate: hears normal conversation without difficulty

## 2018-03-11 NOTE — SWALLOW BEDSIDE ASSESSMENT ADULT - SLP PRECAUTIONS/LIMITATIONS: VISION
· Vision (with corrective lenses if the patient usually wears them):	Normal vision: sees adequately in most situations; can see medication labels, newsprint. + eyeglasses

## 2018-03-11 NOTE — SWALLOW BEDSIDE ASSESSMENT ADULT - DIET PRIOR TO ADMI
spouse and friends at bedside report patient consumed a regular texture diet

## 2018-03-11 NOTE — PROGRESS NOTE ADULT - PROBLEM SELECTOR PLAN 3
Ppm is not indicated at this time as per cardiology.
Ppm is not indicated at this time as per cardiology.
Cardiology consulted. Will get echo, TSH. EP eval in AM
Ppm is not indicated at this time as per cardiology.

## 2018-03-11 NOTE — PROVIDER CONTACT NOTE (OTHER) - ASSESSMENT
A&O x0 mostly.  Patient mostly lethargic, but denied CP/palpitations when asked.  Patient states "I'm alright".  VSS, /92.  PAT broke at 0305, HR 70s-80s now. A&O x0 mostly.  Patient mostly lethargic, but denied CP/palpitations when asked.  Patient states "I'm alright".  VSS, /87.  PAT broke at 0305, HR 60s-80s now.

## 2018-03-11 NOTE — PROGRESS NOTE ADULT - PROBLEM SELECTOR PROBLEM 1
Altered mental status, unspecified altered mental status type

## 2018-03-11 NOTE — SWALLOW BEDSIDE ASSESSMENT ADULT - SWALLOW EVAL: DIAGNOSIS
Chart reviewed and case d/w RN: Carol and NP: April Gipson. Per chart: "comfort measures," and pending palliative consult tomorrow therefore further dysphagia w/u to be deferred pending palliative w/u.
Attempted to see patient for repeat bedside swallow evaluation on this date. Pt s/p LP earlier this date, unclear if able to sit completely upright for purpose of evaluation at this time. Per d/w RN Cat, pt also remains lethargic. At bedside pt with eyes open, however not following directives. Noted vEEG in progress. Case d/w NP Ana, per NP this service to defer swallow evaluation at this time given vEEG in progress. This service to f/u tomorrow, 3/11. Family made aware of POC.
Current mentation does not support PO feeding. PO trials contraindicated.

## 2018-03-11 NOTE — SWALLOW BEDSIDE ASSESSMENT ADULT - COMMENTS
Continued hospital course:   3/4: Provider contact note: + episodes of bradycardia.   3/4: Cardiology consulted for PPM evaluation.   Per EP note 3/5: no PPM at this time  3/7: Event note NP: Constant observation was discontinued on March 5TH, 2018. Pt remains calm and cooperative.  3/8: care coordination note: Pt's discharged canceled due to pt's episode of loose BM r/o CDIFF and pt's mental status is obtunded.   Neurology consulted: Per attending attestation: 67 year old man with history of progressive neurodegenerative process presents with some punctuated worsening. His lack of following is likely consistent with Alzheimer variants exhibited as such.    Pe rd/w NP Demetrio, EEG pending at this time.   Most recent CT brain 3/8: IMPRESSION: Atrophy and small vessel white matter ischemic changes.   Physiologic parenchymal calcification unchanged from 3/3/2018  MRI Brain:  Impression: No evidence of acute infarction or hemorrhage, compared with CT dated 3/3/2018.  Extensive confluent chronic microvascular ischemic changes and multiple chronic lacunar infarctions, as described above.  Bilateral and fairly symmetric parenchymal mineralization in the cerebellar hemispheres and periventricular white matter. This finding may represent an underlying metabolic disorder, such as hypoparathyroidism, or possibly vascular etiology such as vasculopathy or venous congestion.   Small focal area of susceptibility effect in the left parafalcine frontal region in an extra-axial location, measuring 6-7 mm, without significant associated mass effect. This finding may represent a partially calcified meningioma.  CXRAY 3/8: clear lungs    3/10 S/p attempted LP, however per Neuro unable to obtain CSF.  3/11: Neuro f/u: After discussion with family they would like to take a palliative approach respecting the patient's advanced directives.  Concern for increased agitation on keppra, D/C keppra.  Start Ativan 0.5 mg TID for myoclonus.  See below->

## 2018-03-11 NOTE — PROGRESS NOTE ADULT - PROBLEM SELECTOR PROBLEM 2
Early onset Alzheimer's dementia without behavioral disturbance

## 2018-03-11 NOTE — SWALLOW BEDSIDE ASSESSMENT ADULT - SWALLOW EVAL: PROGNOSIS
Per medicine: Early onset Alzheimer's dementia without behavioral disturbance->plan for comfort measures.

## 2018-03-11 NOTE — PROGRESS NOTE ADULT - PROBLEM SELECTOR PLAN 2
MRI done, FU results
On Meds
Comfort measures
MRI done, Results as documented.  They do not explain his sudden decrease in mental function.
MRI done, Results as documented.  They do not explain his sudden decrease in mental function. Now also causing dysphagia, keep NPO for now until swallowing evaluation.  Rest as above
MRI done, Results as documented.  They do not explain his sudden decrease in mental function. Now also causing dysphagia, keep NPO.  Rest as above
On Meds
On Meds. Stop Aricept, may cause bradycardia

## 2018-03-11 NOTE — SWALLOW BEDSIDE ASSESSMENT ADULT - SWALLOW EVAL: CURRENT DIET
Dysphagia 2 and nectar thick liquids

## 2018-03-11 NOTE — PROGRESS NOTE ADULT - PROVIDER SPECIALTY LIST ADULT
Electrophysiology
Internal Medicine
Neurology
Neurology
Internal Medicine
Neurology
Internal Medicine
Internal Medicine

## 2018-03-11 NOTE — PROGRESS NOTE ADULT - PROBLEM SELECTOR PLAN 1
confused more than baseline. Will get neuro evaluation  MRI Brain ordered
Comfort measures only
Worsening of mental status noted.  EEG in progress.  Patient has a signed Advance Directives. He does not want CPR, antibiotics, transfusions, artifical ventilation to sustain life in case of irreversible medical condition.  DNR/DNI ordered
Worsening of mental status noted.  Need to rule out infection.  Cultures sent.
Worsening of mental status noted.  Need to rule out infection.  Cultures sent.  Given the rapid deterioration in dementia like symptoms, other causes of dementia, need to be ruled out. Repeat MRI brain as per Neurology. FU swallowing evaluation
confused more than baseline. Will get neuro evaluation in AM. MRI Brain ordered
confused more than baseline. Will get neuro evaluation  MRI Brain noted
Stable during hospital stay

## 2018-03-11 NOTE — SWALLOW BEDSIDE ASSESSMENT ADULT - SLP PERTINENT HISTORY OF CURRENT PROBLEM
Pt has been treated for dementia, bradycardia (being watched by his cardiologist no pacemaker at this time as per family) for the past x3 years presents to the ED for worsening incontinence and agitation x 3 weeks, attempting to leave the house. Pt has no fever, chills. Pt has been to a neurologist and showed a possible mini stroke x3 yrs ago. This morning the pt was shaking and clenching his hands. Cardiologist is Dr. Palla [phone], neurologist is  Dr. Monica Toscano [phone], and the PMD is  Dr. Aurora Booker [phone]. Experiences no fever, chills, N/V. Experiences no palpitation, chest pain, or SOB. No constipation. + diarrhea 2 days ago. No hematuria. Pt has had no recent travel, and no sick contacts. Family notes that pt is more unstable when he walks.
Pt has been treated for dementia, bradycardia (being watched by his cardiologist no pacemaker at this time as per family) for the past x3 years presents to the ED for worsening incontinence and agitation x 3 weeks, attempting to leave the house. Pt has no fever, chills. Pt has been to a neurologist and showed a possible mini stroke x3 yrs ago. This morning the pt was shaking and clenching his hands. Cardiologist is Dr. Palla [phone], neurologist is  Dr. Monica Toscano [phone], and the PMD is  Dr. Aurora Booker [phone]. Experiences no fever, chills, N/V. Experiences no palpitation, chest pain, or SOB. No constipation. + diarrhea 2 days ago. No hematuria. Pt has had no recent travel, and no sick contacts. Family notes that pt is more unstable when he walks.
Pt has been treated for dementia, bradycardia (being watched by his cardiologist no pacemaker at this time as per family) for the past x3 years presents to the ED for worsening incontinence and agitation x 3 weeks, attempting to leave the house. Pt has no fever, chills. Pt has been to a neurologist and showed a possible mini stroke x3 yrs ago. This morning the pt was shaking and clenching his hands. Cardiologist is Dr. Palla [phone], neurologist is  Dr. Monica Toscano [phone], and the PMD is  Dr. Aurora Booker [phone]. Experiences no fever, chills, N/V. Experiences no palpitation, chest pain, or SOB. No constipation. + diarrhea 2 days ago. No hematuria. Pt has had no recent travel, and no sick contacts. Family notes that pt is more unstable when he walks.

## 2018-03-11 NOTE — PROGRESS NOTE ADULT - ASSESSMENT
67 year old man with decompensation in mental status.  Now obtunded.  There is no identifiable cause for the deterioration.  EEG was unrevealing for focal or specific process.  After discussion with family they would like to take a palliative approach respecting the patient's advanced directives.  Concern for increased agitation on keppra, D/C keppra.  Start Ativan 0.5 mg TID for myoclonus.

## 2018-03-11 NOTE — PROGRESS NOTE ADULT - PROBLEM SELECTOR PROBLEM 3
Bradycardia, sinus

## 2018-03-12 DIAGNOSIS — R06.82 TACHYPNEA, NOT ELSEWHERE CLASSIFIED: ICD-10-CM

## 2018-03-12 DIAGNOSIS — Z71.89 OTHER SPECIFIED COUNSELING: ICD-10-CM

## 2018-03-12 DIAGNOSIS — R13.10 DYSPHAGIA, UNSPECIFIED: ICD-10-CM

## 2018-03-12 DIAGNOSIS — F03.90 UNSPECIFIED DEMENTIA WITHOUT BEHAVIORAL DISTURBANCE: ICD-10-CM

## 2018-03-12 LAB
ANION GAP SERPL CALC-SCNC: 13 MMOL/L — SIGNIFICANT CHANGE UP (ref 5–17)
BUN SERPL-MCNC: 18 MG/DL — SIGNIFICANT CHANGE UP (ref 7–23)
CALCIUM SERPL-MCNC: 9.7 MG/DL — SIGNIFICANT CHANGE UP (ref 8.4–10.5)
CHLORIDE SERPL-SCNC: 103 MMOL/L — SIGNIFICANT CHANGE UP (ref 96–108)
CO2 SERPL-SCNC: 28 MMOL/L — SIGNIFICANT CHANGE UP (ref 22–31)
CREAT SERPL-MCNC: 0.66 MG/DL — SIGNIFICANT CHANGE UP (ref 0.5–1.3)
GLUCOSE SERPL-MCNC: 112 MG/DL — HIGH (ref 70–99)
POTASSIUM SERPL-MCNC: 3.3 MMOL/L — LOW (ref 3.5–5.3)
POTASSIUM SERPL-SCNC: 3.3 MMOL/L — LOW (ref 3.5–5.3)
SODIUM SERPL-SCNC: 144 MMOL/L — SIGNIFICANT CHANGE UP (ref 135–145)

## 2018-03-12 PROCEDURE — 99223 1ST HOSP IP/OBS HIGH 75: CPT | Mod: GC

## 2018-03-12 RX ORDER — ROBINUL 0.2 MG/ML
0.4 INJECTION INTRAMUSCULAR; INTRAVENOUS EVERY 4 HOURS
Qty: 0 | Refills: 0 | Status: DISCONTINUED | OUTPATIENT
Start: 2018-03-12 | End: 2018-03-13

## 2018-03-12 RX ORDER — FUROSEMIDE 40 MG
40 TABLET ORAL ONCE
Qty: 0 | Refills: 0 | Status: COMPLETED | OUTPATIENT
Start: 2018-03-12 | End: 2018-03-12

## 2018-03-12 RX ORDER — HYDROMORPHONE HYDROCHLORIDE 2 MG/ML
0.2 INJECTION INTRAMUSCULAR; INTRAVENOUS; SUBCUTANEOUS
Qty: 0 | Refills: 0 | Status: DISCONTINUED | OUTPATIENT
Start: 2018-03-12 | End: 2018-03-13

## 2018-03-12 RX ORDER — MORPHINE SULFATE 50 MG/1
0.5 CAPSULE, EXTENDED RELEASE ORAL ONCE
Qty: 0 | Refills: 0 | Status: DISCONTINUED | OUTPATIENT
Start: 2018-03-12 | End: 2018-03-12

## 2018-03-12 RX ORDER — ACETAMINOPHEN 500 MG
325 TABLET ORAL EVERY 4 HOURS
Qty: 0 | Refills: 0 | Status: DISCONTINUED | OUTPATIENT
Start: 2018-03-12 | End: 2018-03-13

## 2018-03-12 RX ORDER — ROBINUL 0.2 MG/ML
1 INJECTION INTRAMUSCULAR; INTRAVENOUS ONCE
Qty: 0 | Refills: 0 | Status: DISCONTINUED | OUTPATIENT
Start: 2018-03-12 | End: 2018-03-12

## 2018-03-12 RX ADMIN — Medication 40 MILLIGRAM(S): at 10:19

## 2018-03-12 RX ADMIN — HEPARIN SODIUM 5000 UNIT(S): 5000 INJECTION INTRAVENOUS; SUBCUTANEOUS at 05:42

## 2018-03-12 RX ADMIN — NYSTATIN CREAM 1 APPLICATION(S): 100000 CREAM TOPICAL at 18:22

## 2018-03-12 RX ADMIN — Medication 0.5 MILLIGRAM(S): at 16:36

## 2018-03-12 RX ADMIN — HYDROMORPHONE HYDROCHLORIDE 0.2 MILLIGRAM(S): 2 INJECTION INTRAMUSCULAR; INTRAVENOUS; SUBCUTANEOUS at 16:34

## 2018-03-12 RX ADMIN — Medication 1 SPRAY(S): at 05:51

## 2018-03-12 RX ADMIN — MORPHINE SULFATE 0.5 MILLIGRAM(S): 50 CAPSULE, EXTENDED RELEASE ORAL at 10:19

## 2018-03-12 RX ADMIN — MORPHINE SULFATE 0.5 MILLIGRAM(S): 50 CAPSULE, EXTENDED RELEASE ORAL at 10:50

## 2018-03-12 RX ADMIN — HYDROMORPHONE HYDROCHLORIDE 0.2 MILLIGRAM(S): 2 INJECTION INTRAMUSCULAR; INTRAVENOUS; SUBCUTANEOUS at 20:10

## 2018-03-12 RX ADMIN — Medication 0.5 MILLIGRAM(S): at 05:42

## 2018-03-12 RX ADMIN — HYDROMORPHONE HYDROCHLORIDE 0.2 MILLIGRAM(S): 2 INJECTION INTRAMUSCULAR; INTRAVENOUS; SUBCUTANEOUS at 23:34

## 2018-03-12 RX ADMIN — ROBINUL 0.4 MILLIGRAM(S): 0.2 INJECTION INTRAMUSCULAR; INTRAVENOUS at 20:41

## 2018-03-12 RX ADMIN — Medication 0.5 MILLIGRAM(S): at 13:05

## 2018-03-12 RX ADMIN — Medication 0.5 MILLIGRAM(S): at 22:28

## 2018-03-12 RX ADMIN — NYSTATIN CREAM 1 APPLICATION(S): 100000 CREAM TOPICAL at 05:42

## 2018-03-12 NOTE — CONSULT NOTE ADULT - ATTENDING COMMENTS
67 year old man with history of progressive neurodegenerative process presents with some punctuated worsening. His lack of following is likely consistent with Alzheimer variants exhibited as such.
I was physically present for the key portions of the evaluation and management (E/M) service provided.  I agree with the above history, physical, and plan which I have reviewed and edited where appropriate. Plan discussed with team.

## 2018-03-12 NOTE — CONSULT NOTE ADULT - ASSESSMENT
68yo M with PMHx dementia, bradycardia (being watched by his cardiologist no pacemaker at this time as per family) for the past x3 years presents to the ED for worsening incontinence and agitation x 3 weeks, attempting to leave the house. Consulted for PPM evaluation. Per chart review, patient's cardiologist Dr. Palla patient has had bradycardia in the past, appears to have been sinus. Patient had stress test recently per chart and heart rate was responsive to exertion. Patient on donepezil as well, could be 2/2 to that. Patient bradycardic to 50's on tele here, sinus. No changes in symptoms during bradycardia or during normal heart rate.    -f/u TTE  -continue to monitor on tele  -avoid AV mark blockers
67M PMH dementia, bradycardia (being watched by his cardiologist no pacemaker at this time as per family) for the past x3 years, ?TIA (3 yrs ago) who was admitted on 3/3/18 for worsening incontinence and agitation x3 wks who has demonstrated progressive cognitive and functional decline since admission with work-up negative for organic cause and likely due to progressive dementia and inability to swallow.

## 2018-03-12 NOTE — CONSULT NOTE ADULT - PROBLEM SELECTOR RECOMMENDATION 2
Patient has been increasingly tachypneic, suspected he is aspirating in setting of dysphagia.  - On room are at this time.   - DNI, no invasive measures. Patient has been increasingly tachypneic, suspected he is aspirating in setting of dysphagia.  - On room air at this time.   - DNI, no invasive measures.

## 2018-03-12 NOTE — CONSULT NOTE ADULT - SUBJECTIVE AND OBJECTIVE BOX
HPI:  67M PMH dementia, bradycardia (being watched by his cardiologist no pacemaker at this time as per family) for the past x3 years, ?TIA (3 yrs ago) who was admitted on 3/3/18 for worsening incontinence and agitation x3 wks. That morning, he had been shaking and clenching his hands. Family also noted decreased stability with walking, and diarrhea 2 days prior. No fevers/chills, N/V, palpitations, SP, SOB, constipation, hematuria. No recent travel or sick contacts.    Cardiologist is Dr. Palla [phone], neurologist is  Dr. Monica Toscano [phone], and the PMD is  Dr. Aurora Booker [phone].     Hospital Course:  Patient had CT Head and urinalysis after arrival, both of which were negative for work-up of worsening mental status. Patient was found to go bradycardic to the 30's overnight, and 2 days later with PAT to 130's. Patient was seen by EP with determination not to have PPM placed at this time, hold AVN agents. Cardiac MRI ordered to eval for sarcoidosis, which did not demonstrate infiltrative cardiomyopathy or myocardial scar, heterogeneity noted of R lobe thyroid gland. TTE EF 70%, normal LV dementions and systolic fnc. Aricept was discontinued by neuro as may cause bradycardia.   Neurology evaluation for worsening confusion, MRI brain demonstrated no new abnormalities as compared with prior CT 3/3/18. Extensive confluent chronic microvascular ischemic changes and multiple chronic lacunar infarctions were noted, with bilateral/symmetric parenchymal mineralization in cerebellar hemispheres and periventricular white matter which may represent vascular or metabolic etiology, and ?partially calcified meningioma.   Patient became more confused on 3/8 with CT H demonstrating no changes, mental status has not been improving. Evaluation by Speech and Swallow with recommendation for NPO status.  LP performed by neurology on 3/10 and EEG performed with no identifiable cause for deterioration. Discussions with team and family led to family deciding to take palliative approach and respect patient's advanced directives. Keppra was discontinued due to increased agitation, patient was started on ativan 0.5 mg TID for myoclonus.    PERTINENT PMH REVIEWED:  [ ] YES [ ] NO           SOCIAL HISTORY:  Significant other/partner:  [ ] YES  [ ] NO            Children:  [ ] YES  [ ] NO                   Worship/Spirituality:  Substance hx:  [ ] YES   [ ] NO           Tobacco hx:  [ ] YES  [ ] NO             Alcohol hx: [ ] YES  [ ] NO        Home Opioid hx:  [ ] YES  [ ] NO   Living Situation: [ ] Home  [ ] Long term care  [ ] Rehab    REFERRALS:   [ ] Chaplaincy  [ ] Hospice  [ ] Child Life  [ ] Social Work  [ ] Case management [ ] Holistic Therapy     FAMILY HISTORY:  No pertinent family history in first degree relatives    [ ] Family history non contributory     BASELINE ADLs (prior to admission):  Independent [ ] moderately [ ] fully   Dependent   [ ] moderately [ ] fully    ADVANCE DIRECTIVES:  [ ] YES [ ] NO   DNR [ ] YES [ ] NO                      MOLST  [ ] YES [ ] NO    Living Will  [ ] YES [ ] NO    Health Care Proxy [ ] YES  [ ] NO      [ ] Surrogate  [ ] HCP  [ ] Guardian:                                                                  Phone#:    Allergies    No Known Allergies    Intolerances        MEDICATIONS  (STANDING):  amLODIPine   Tablet 5 milliGRAM(s) Oral daily  aspirin  chewable 81 milliGRAM(s) Oral daily  furosemide   Injectable 40 milliGRAM(s) IV Push once  glycopyrrolate 1 milliGRAM(s) Oral once  heparin  Injectable 5000 Unit(s) SubCutaneous every 12 hours  influenza   Vaccine 0.5 milliLiter(s) IntraMuscular once  LORazepam   Injectable 0.5 milliGRAM(s) IV Push every 8 hours  morphine  - Injectable 0.5 milliGRAM(s) IV Push once  nystatin Powder 1 Application(s) Topical two times a day  saliva substitute (BIOTENE MOISTURIZING MOUTH SPRAY) 1 [place](s) Topical two times a day  sodium chloride 0.9%. 1000 milliLiter(s) (50 mL/Hr) IV Continuous <Continuous>    MEDICATIONS  (PRN):      PRESENT SYMPTOMS:  Source: [ ] Patient   [ ] Family   [ ] Team     Pain: [ ] YES [ ] NO  OLDCARTS:     Dyspnea: [ ] YES [ ] NO   Anxiety: [ ] YES [ ] NO  Fatigue: [ ] YES [ ] NO   Nausea: [ ] YES [ ] NO  Loss of appetite: [ ] YES [ ] NO   Constipation: [ ] YES [ ] NO     Other Symptoms:  [ ] All other review of systems negative   [ ] Unable to obtain due to poor mentation     Karnofsky Performance Score/Palliative Performance Status Version 2:         %  Protein Calorie Malutrition:  [ ] Mild   [ ] Moderate   [ ] Severe     Vital Signs Last 24 Hrs  T(C): 37.4 (12 Mar 2018 05:07), Max: 37.4 (12 Mar 2018 05:07)  T(F): 99.3 (12 Mar 2018 05:07), Max: 99.3 (12 Mar 2018 05:07)  HR: 74 (12 Mar 2018 05:07) (74 - 80)  BP: 167/98 (12 Mar 2018 05:07) (144/82 - 167/98)  BP(mean): --  RR: 18 (12 Mar 2018 05:07) (18 - 18)  SpO2: 94% (12 Mar 2018 05:07) (94% - 95%)    Physical Exam:    General: [ ] Alert,  A&O x     [ ] lethargic   [ ] Agitated   [ ] Cachexia   HEENT: [ ] Normal   [ ] Dry mouth   [ ] ET Tube    [ ] Trach   Lungs: [ ] Clear [ ] Rhonchi  [ ] Crackles [ ] Wheezing [ ] Tachypnea  [ ] Audible excessive secretions   Cardiovascular:  [ ] Regular rate and rhythm  [ ] Irregular [ ] Tachycardia   [ ] Bradycardia   Abdomen: [ ] Soft  [ ] Distended  [ ]  [ ] +BS  [ ] Non tender [ ] Tender  [ ]PEG   [ ] NGT   Last BM:     Genitourinary: [ ] Normal [ ] Incontinent   [ ] Oliguria/Anuria   [ ] Cavanaugh  Musculoskeletal:  [ ] Normal   [ ] Generalized weakness  [ ] Bedbound   Neurological: [ ] No focal deficits  [ ] Cognitive impairment     Skin: [ ] Normal   [ ] Pressure ulcers     LABS:    03-12    144  |  103  |  18  ----------------------------<  112<H>  3.3<L>   |  28  |  0.66    Ca    9.7      12 Mar 2018 06:29          I&O's Summary    11 Mar 2018 07:01  -  12 Mar 2018 07:00  --------------------------------------------------------  IN: 600 mL / OUT: 0 mL / NET: 600 mL        RADIOLOGY & ADDITIONAL STUDIES: HPI:  67M PMH dementia, bradycardia (being watched by his cardiologist no pacemaker at this time as per family) for the past x3 years, ?TIA (3 yrs ago) who was admitted on 3/3/18 for worsening incontinence and agitation x3 wks. That morning, he had been shaking and clenching his hands. Family also noted decreased stability with walking, and diarrhea 2 days prior. No fevers/chills, N/V, palpitations, SP, SOB, constipation, hematuria. No recent travel or sick contacts.    Cardiologist is Dr. Palla [phone], neurologist is  Dr. Monica Toscano [phone], and the PMD is  Dr. Aurora Booker [phone].     Hospital Course:  Patient had CT Head and urinalysis after arrival, both of which were negative for work-up of worsening mental status. Patient was found to go bradycardic to the 30's overnight, and 2 days later with PAT to 130's. Patient was seen by EP with determination not to have PPM placed at this time, hold AVN agents. Cardiac MRI ordered to eval for sarcoidosis, which did not demonstrate infiltrative cardiomyopathy or myocardial scar, heterogeneity noted of R lobe thyroid gland. TTE EF 70%, normal LV dementions and systolic fnc. Aricept was discontinued by neuro as may cause bradycardia.   Neurology evaluation for worsening confusion, MRI brain demonstrated no new abnormalities as compared with prior CT 3/3/18. Extensive confluent chronic microvascular ischemic changes and multiple chronic lacunar infarctions were noted, with bilateral/symmetric parenchymal mineralization in cerebellar hemispheres and periventricular white matter which may represent vascular or metabolic etiology, and ?partially calcified meningioma.   Patient became more confused on 3/8 with CT H demonstrating no changes, mental status has not been improving. Evaluation by Speech and Swallow with recommendation for NPO status.  LP performed by neurology on 3/10 and EEG performed with no identifiable cause for deterioration. Discussions with team and family led to family deciding to take palliative approach and respect patient's advanced directives. Keppra was discontinued due to increased agitation, patient was started on ativan 0.5 mg TID for myoclonus.    PERTINENT PMH REVIEWED:  [x] YES [ ] NO           SOCIAL HISTORY:  Significant other/partner:  [x] YES  [ ] NO            Children:  [ ] YES  [ ] NO                   Presybeterian/Spirituality:  Substance hx:  [ ] YES   [x] NO           Tobacco hx:  [ ] YES  [x] NO             Alcohol hx: [ ] YES  [x] NO        Home Opioid hx:  [ ] YES  [x] NO   Living Situation: [x] Home  [ ] Long term care  [ ] Rehab    REFERRALS:   [ ] Chaplaincy  [x] Hospice  [ ] Child Life  [ ] Social Work  [ ] Case management [ ] Holistic Therapy     FAMILY HISTORY:  No pertinent family history in first degree relatives    [x] Family history non contributory     BASELINE ADLs (prior to admission):  Independent [ ] moderately [ ] fully   Dependent   [ ] moderately [ ] fully    ADVANCE DIRECTIVES:  [x] YES [ ] NO   DNR [x] YES [ ] NO                      MOLST  [ ] YES [ ] NO    Living Will  [x] YES [ ] NO    Health Care Proxy [ ] YES  [ ] NO      [ ] Surrogate  [ ] HCP  [ ] Guardian:                                                                  Phone#:    Allergies    No Known Allergies    Intolerances        MEDICATIONS  (STANDING):  amLODIPine   Tablet 5 milliGRAM(s) Oral daily  aspirin  chewable 81 milliGRAM(s) Oral daily  furosemide   Injectable 40 milliGRAM(s) IV Push once  glycopyrrolate 1 milliGRAM(s) Oral once  heparin  Injectable 5000 Unit(s) SubCutaneous every 12 hours  influenza   Vaccine 0.5 milliLiter(s) IntraMuscular once  LORazepam   Injectable 0.5 milliGRAM(s) IV Push every 8 hours  morphine  - Injectable 0.5 milliGRAM(s) IV Push once  nystatin Powder 1 Application(s) Topical two times a day  saliva substitute (BIOTENE MOISTURIZING MOUTH SPRAY) 1 [place](s) Topical two times a day  sodium chloride 0.9%. 1000 milliLiter(s) (50 mL/Hr) IV Continuous <Continuous>    MEDICATIONS  (PRN):      PRESENT SYMPTOMS:  Source: [ ] Patient   [ ] Family   [ ] Team     Pain: [ ] YES [ ] NO  OLDCARTS:     Dyspnea: [X] YES [ ] NO   Anxiety: [ ] YES [ ] NO  Fatigue: [ ] YES [ ] NO   Nausea: [ ] YES [ ] NO  Loss of appetite: [ ] YES [ ] NO   Constipation: [ ] YES [ ] NO     Other Symptoms:  [ ] All other review of systems negative   [X] Unable to obtain due to poor mentation     Karnofsky Performance Score/Palliative Performance Status Version 2:         %  Protein Calorie Malutrition:  [ ] Mild   [ ] Moderate   [ ] Severe     Vital Signs Last 24 Hrs  T(C): 37.4 (12 Mar 2018 05:07), Max: 37.4 (12 Mar 2018 05:07)  T(F): 99.3 (12 Mar 2018 05:07), Max: 99.3 (12 Mar 2018 05:07)  HR: 74 (12 Mar 2018 05:07) (74 - 80)  BP: 167/98 (12 Mar 2018 05:07) (144/82 - 167/98)  BP(mean): --  RR: 18 (12 Mar 2018 05:07) (18 - 18)  SpO2: 94% (12 Mar 2018 05:07) (94% - 95%)    Physical Exam:    General: [x] Not arousable     [ ] lethargic   [ ] Agitated   [ ] Cachexia   HEENT: [ ] Normal   [x] Dry mouth   [ ] ET Tube    [ ] Trach   Lungs: [ ] Clear [ ] Rhonchi  [ ] Crackles [ ] Wheezing [x] Tachypnea  [x] Audible excessive secretions   Cardiovascular:  [ ] Regular rate and rhythm  [ ] Irregular [x] Tachycardia   [ ] Bradycardia   Abdomen: [x] Soft  [ ] Distended  [ ]  [ ] +BS  [ ] Non tender [ ] Tender  [ ]PEG   [ ] NGT   Last BM:     Genitourinary: [ ] Normal [ ] Incontinent   [ ] Oliguria/Anuria   [ ] Cavanaugh  Musculoskeletal:  [ ] Normal   [ ] Generalized weakness  [x] Bedbound   Neurological: [ ] No focal deficits  [x] Cognitive impairment     Skin: [x] Normal   [ ] Pressure ulcers     LABS:    03-12    144  |  103  |  18  ----------------------------<  112<H>  3.3<L>   |  28  |  0.66    Ca    9.7      12 Mar 2018 06:29          I&O's Summary    11 Mar 2018 07:01  -  12 Mar 2018 07:00  --------------------------------------------------------  IN: 600 mL / OUT: 0 mL / NET: 600 mL        RADIOLOGY & ADDITIONAL STUDIES: HPI:  67M PMH dementia, bradycardia (being watched by his cardiologist no pacemaker at this time as per family) for the past x3 years, ?TIA (3 yrs ago) who was admitted on 3/3/18 for worsening incontinence and agitation x3 wks. That morning, he had been shaking and clenching his hands. Family also noted decreased stability with walking, and diarrhea 2 days prior. No fevers/chills, N/V, palpitations, SP, SOB, constipation, hematuria. No recent travel or sick contacts.    Cardiologist is Dr. Palla [phone], neurologist is  Dr. Monica Toscano [phone], and the PMD is  Dr. Aurora Booker [phone].     Hospital Course:  Patient had CT Head and urinalysis after arrival, both of which were negative for work-up of worsening mental status. Patient was found to go bradycardic to the 30's overnight, and 2 days later with PAT to 130's. Patient was seen by EP with determination not to have PPM placed at this time, hold AVN agents. Cardiac MRI ordered to eval for sarcoidosis, which did not demonstrate infiltrative cardiomyopathy or myocardial scar, heterogeneity noted of R lobe thyroid gland. TTE EF 70%, normal LV dementions and systolic fnc. Aricept was discontinued by neuro as may cause bradycardia.   Neurology evaluation for worsening confusion, MRI brain demonstrated no new abnormalities as compared with prior CT 3/3/18. Extensive confluent chronic microvascular ischemic changes and multiple chronic lacunar infarctions were noted, with bilateral/symmetric parenchymal mineralization in cerebellar hemispheres and periventricular white matter which may represent vascular or metabolic etiology, and ?partially calcified meningioma.   Patient became more confused on 3/8 with CT H demonstrating no changes, mental status has not been improving. Evaluation by Speech and Swallow with recommendation for NPO status.  LP performed by neurology on 3/10 and EEG performed with no identifiable cause for deterioration. Discussions with team and family led to family deciding to take palliative approach and respect patient's advanced directives. Keppra was discontinued due to increased agitation, patient was started on ativan 0.5 mg TID for myoclonus.  Given worsening MS without improvement or clear etiology, family desired comfort care approach in PCU.     PERTINENT PMH REVIEWED:  [x] YES [ ] NO           SOCIAL HISTORY:  Significant other/partner:  [x] YES  [ ] NO            Children:  [ ] YES  [ ] NO                   Congregation/Spirituality:  Substance hx:  [ ] YES   [x] NO           Tobacco hx:  [ ] YES  [x] NO             Alcohol hx: [ ] YES  [x] NO        Home Opioid hx:  [ ] YES  [x] NO   Living Situation: [x] Home  [ ] Long term care  [ ] Rehab    REFERRALS:   [ ] Chaplaincy  [x] Hospice  [ ] Child Life  [ ] Social Work  [ ] Case management [ ] Holistic Therapy     FAMILY HISTORY:  No pertinent family history in first degree relatives    [x] Family history non contributory     BASELINE ADLs (prior to admission):  Independent [ ] moderately [ ] fully   Dependent   [ ] moderately [ ] fully    ADVANCE DIRECTIVES:  [x] YES [ ] NO   DNR [x] YES [ ] NO                      MOLST  [ ] YES [ ] NO    Living Will  [x] YES [ ] NO    Health Care Proxy [ ] YES  [ ] NO      [X ] Surrogate wife     Tiara [phone]    Allergies    No Known Allergies    Intolerances    MEDICATIONS  (STANDING):  amLODIPine   Tablet 5 milliGRAM(s) Oral daily  aspirin  chewable 81 milliGRAM(s) Oral daily  glycopyrrolate 1 milliGRAM(s) Oral once  heparin  Injectable 5000 Unit(s) SubCutaneous every 12 hours  influenza   Vaccine 0.5 milliLiter(s) IntraMuscular once  LORazepam   Injectable 0.5 milliGRAM(s) IV Push every 8 hours  nystatin Powder 1 Application(s) Topical two times a day  saliva substitute (BIOTENE MOISTURIZING MOUTH SPRAY) 1 [place](s) Topical two times a day  sodium chloride 0.9%. 1000 milliLiter(s) (50 mL/Hr) IV Continuous <Continuous>    PRESENT SYMPTOMS:  Source: [ ] Patient   [X ] Family   [X ] Team     Pain: [ ] YES [X ] NO  OLDCARTS:     Dyspnea: [X] YES [ ] NO   Anxiety: [ ] YES [ ] NO  Fatigue: [ ] YES [ ] NO   Nausea: [ ] YES [ ] NO  Loss of appetite: [ ] YES [ ] NO   Constipation: [ ] YES [ ] NO     Other Symptoms:  [ ] All other review of systems negative   [X] Unable to obtain due to poor mentation     Karnofsky Performance Score/Palliative Performance Status Version 2: 20%  Protein Calorie Malutrition:  [ ] Mild   [ ] Moderate   [ ] Severe     Vital Signs Last 24 Hrs  T(C): 37.4 (12 Mar 2018 05:07), Max: 37.4 (12 Mar 2018 05:07)  T(F): 99.3 (12 Mar 2018 05:07), Max: 99.3 (12 Mar 2018 05:07)  HR: 74 (12 Mar 2018 05:07) (74 - 80)  BP: 167/98 (12 Mar 2018 05:07) (144/82 - 167/98)  BP(mean): --  RR: 18 (12 Mar 2018 05:07) (18 - 18)  SpO2: 94% (12 Mar 2018 05:07) (94% - 95%)    Physical Exam:    General: [x] Not arousable     [ ] lethargic   [ ] Agitated   [ ] Cachexia   HEENT: [ ] Normal   [x] Dry mouth   [ ] ET Tube    [ ] Trach   Lungs: [ ] Clear [ ] Rhonchi  [ ] Crackles [ ] Wheezing [x] Tachypnea  [x] Audible excessive secretions   Cardiovascular:  [ ] Regular rate and rhythm  [ ] Irregular [x] Tachycardia   [ ] Bradycardia   Abdomen: [x] Soft  [ ] Distended  [ ]  [ ] +BS  [ ] Non tender [ ] Tender  [ ]PEG   [ ] NGT   Last BM:     Genitourinary: [ ] Normal [ ] Incontinent   [ ] Oliguria/Anuria   [x ] no nash  Musculoskeletal:  [ ] Normal   [ ] Generalized weakness  [x] Bedbound   Neurological: [ ] No focal deficits  [x] Cognitive impairment     Skin: [x] Normal   [ ] Pressure ulcers     LABS:    03-12    144  |  103  |  18  ----------------------------<  112<H>  3.3<L>   |  28  |  0.66    Ca    9.7      12 Mar 2018 06:29          I&O's Summary    11 Mar 2018 07:01  -  12 Mar 2018 07:00  --------------------------------------------------------  IN: 600 mL / OUT: 0 mL / NET: 600 mL        RADIOLOGY & ADDITIONAL STUDIES: CXR: Clear lungs.

## 2018-03-12 NOTE — CONSULT NOTE ADULT - PROBLEM SELECTOR RECOMMENDATION 5
Patient has a living will, family would like to respect wishes outlined in living will. Full comfort measures, DNR/DNI, no pressors, no feeding tube.  - Admission to PCU

## 2018-03-12 NOTE — CONSULT NOTE ADULT - PROBLEM SELECTOR RECOMMENDATION 3
Recommended NPO by Speech and Swallow.  - No PEG or invasive measures as per family  - Further discussions for pleasure feeds as per family. Patient not alert enough at this time to express wishes for food.

## 2018-03-12 NOTE — DIETITIAN INITIAL EVALUATION ADULT. - ENERGY NEEDS
HT not available,  pounds, UBW ?  Dxd with Agitation, change in mental status, alzheimer's dementia.  Awaiting palliative  Skin intact.

## 2018-03-12 NOTE — DIETITIAN INITIAL EVALUATION ADULT. - OTHER INFO
Patient seen for routine LOS assessment. Upon chart review and speaking with RN, patient found to be for a palliative care consult and awaiting directives from team.  As per NP, comfort measures.

## 2018-03-12 NOTE — CONSULT NOTE ADULT - PROBLEM SELECTOR RECOMMENDATION 9
Patient with progressive end stage dementia. Organic etiologies for progression ruled out. Patient currently not alert and now aspirating.  - Will bring to PCU for comfort care

## 2018-03-13 VITALS
RESPIRATION RATE: 18 BRPM | DIASTOLIC BLOOD PRESSURE: 80 MMHG | SYSTOLIC BLOOD PRESSURE: 125 MMHG | TEMPERATURE: 103 F | HEART RATE: 132 BPM | OXYGEN SATURATION: 76 %

## 2018-03-13 LAB
ARSENIC BLD-MCNC: 7 UG/L — SIGNIFICANT CHANGE UP (ref 2–23)
CADMIUM SERPL-MCNC: SIGNIFICANT CHANGE UP UG/L (ref 0–1.2)
CULTURE RESULTS: SIGNIFICANT CHANGE UP
CULTURE RESULTS: SIGNIFICANT CHANGE UP
LEAD BLD-MCNC: 2 UG/DL — SIGNIFICANT CHANGE UP (ref 0–19)
MERCURY SERPL-MCNC: 2.3 UG/L — SIGNIFICANT CHANGE UP (ref 0–14.9)
SPECIMEN SOURCE: SIGNIFICANT CHANGE UP
SPECIMEN SOURCE: SIGNIFICANT CHANGE UP

## 2018-03-13 PROCEDURE — 85730 THROMBOPLASTIN TIME PARTIAL: CPT

## 2018-03-13 PROCEDURE — 87186 SC STD MICRODIL/AGAR DIL: CPT

## 2018-03-13 PROCEDURE — 93306 TTE W/DOPPLER COMPLETE: CPT

## 2018-03-13 PROCEDURE — 80048 BASIC METABOLIC PNL TOTAL CA: CPT

## 2018-03-13 PROCEDURE — 82330 ASSAY OF CALCIUM: CPT

## 2018-03-13 PROCEDURE — 97161 PT EVAL LOW COMPLEX 20 MIN: CPT

## 2018-03-13 PROCEDURE — 96374 THER/PROPH/DIAG INJ IV PUSH: CPT | Mod: XU

## 2018-03-13 PROCEDURE — 85610 PROTHROMBIN TIME: CPT

## 2018-03-13 PROCEDURE — 96376 TX/PRO/DX INJ SAME DRUG ADON: CPT | Mod: XU

## 2018-03-13 PROCEDURE — 83550 IRON BINDING TEST: CPT

## 2018-03-13 PROCEDURE — 83825 ASSAY OF MERCURY: CPT

## 2018-03-13 PROCEDURE — 82803 BLOOD GASES ANY COMBINATION: CPT

## 2018-03-13 PROCEDURE — 70450 CT HEAD/BRAIN W/O DYE: CPT

## 2018-03-13 PROCEDURE — 85027 COMPLETE CBC AUTOMATED: CPT

## 2018-03-13 PROCEDURE — 84443 ASSAY THYROID STIM HORMONE: CPT

## 2018-03-13 PROCEDURE — 82310 ASSAY OF CALCIUM: CPT

## 2018-03-13 PROCEDURE — 84100 ASSAY OF PHOSPHORUS: CPT

## 2018-03-13 PROCEDURE — 99285 EMERGENCY DEPT VISIT HI MDM: CPT | Mod: 25

## 2018-03-13 PROCEDURE — A9585: CPT

## 2018-03-13 PROCEDURE — 82435 ASSAY OF BLOOD CHLORIDE: CPT

## 2018-03-13 PROCEDURE — 85014 HEMATOCRIT: CPT

## 2018-03-13 PROCEDURE — 95951: CPT

## 2018-03-13 PROCEDURE — 82962 GLUCOSE BLOOD TEST: CPT

## 2018-03-13 PROCEDURE — 80053 COMPREHEN METABOLIC PANEL: CPT

## 2018-03-13 PROCEDURE — 82728 ASSAY OF FERRITIN: CPT

## 2018-03-13 PROCEDURE — 75561 CARDIAC MRI FOR MORPH W/DYE: CPT

## 2018-03-13 PROCEDURE — 82947 ASSAY GLUCOSE BLOOD QUANT: CPT

## 2018-03-13 PROCEDURE — 84295 ASSAY OF SERUM SODIUM: CPT

## 2018-03-13 PROCEDURE — 83735 ASSAY OF MAGNESIUM: CPT

## 2018-03-13 PROCEDURE — 82175 ASSAY OF ARSENIC: CPT

## 2018-03-13 PROCEDURE — 71045 X-RAY EXAM CHEST 1 VIEW: CPT

## 2018-03-13 PROCEDURE — 70551 MRI BRAIN STEM W/O DYE: CPT

## 2018-03-13 PROCEDURE — 51701 INSERT BLADDER CATHETER: CPT

## 2018-03-13 PROCEDURE — 87086 URINE CULTURE/COLONY COUNT: CPT

## 2018-03-13 PROCEDURE — 84132 ASSAY OF SERUM POTASSIUM: CPT

## 2018-03-13 PROCEDURE — 95819 EEG AWAKE AND ASLEEP: CPT

## 2018-03-13 PROCEDURE — 83970 ASSAY OF PARATHORMONE: CPT

## 2018-03-13 PROCEDURE — 81001 URINALYSIS AUTO W/SCOPE: CPT

## 2018-03-13 PROCEDURE — 83605 ASSAY OF LACTIC ACID: CPT

## 2018-03-13 PROCEDURE — 92610 EVALUATE SWALLOWING FUNCTION: CPT

## 2018-03-13 PROCEDURE — 82300 ASSAY OF CADMIUM: CPT

## 2018-03-13 PROCEDURE — 81003 URINALYSIS AUTO W/O SCOPE: CPT

## 2018-03-13 PROCEDURE — 93005 ELECTROCARDIOGRAM TRACING: CPT

## 2018-03-13 PROCEDURE — 83655 ASSAY OF LEAD: CPT

## 2018-03-13 PROCEDURE — 87040 BLOOD CULTURE FOR BACTERIA: CPT

## 2018-03-13 RX ORDER — HYDROMORPHONE HYDROCHLORIDE 2 MG/ML
0.5 INJECTION INTRAMUSCULAR; INTRAVENOUS; SUBCUTANEOUS EVERY 4 HOURS
Qty: 0 | Refills: 0 | Status: DISCONTINUED | OUTPATIENT
Start: 2018-03-13 | End: 2018-03-13

## 2018-03-13 RX ORDER — ACETAMINOPHEN 500 MG
1000 TABLET ORAL ONCE
Qty: 0 | Refills: 0 | Status: COMPLETED | OUTPATIENT
Start: 2018-03-13 | End: 2018-03-13

## 2018-03-13 RX ORDER — HYDROMORPHONE HYDROCHLORIDE 2 MG/ML
1 INJECTION INTRAMUSCULAR; INTRAVENOUS; SUBCUTANEOUS
Qty: 0 | Refills: 0 | Status: DISCONTINUED | OUTPATIENT
Start: 2018-03-13 | End: 2018-03-13

## 2018-03-13 RX ADMIN — Medication 1 SPRAY(S): at 06:53

## 2018-03-13 RX ADMIN — HYDROMORPHONE HYDROCHLORIDE 0.5 MILLIGRAM(S): 2 INJECTION INTRAMUSCULAR; INTRAVENOUS; SUBCUTANEOUS at 09:03

## 2018-03-13 RX ADMIN — NYSTATIN CREAM 1 APPLICATION(S): 100000 CREAM TOPICAL at 06:09

## 2018-03-13 RX ADMIN — Medication 0.5 MILLIGRAM(S): at 09:00

## 2018-03-13 RX ADMIN — HYDROMORPHONE HYDROCHLORIDE 0.2 MILLIGRAM(S): 2 INJECTION INTRAMUSCULAR; INTRAVENOUS; SUBCUTANEOUS at 06:08

## 2018-03-13 RX ADMIN — Medication 400 MILLIGRAM(S): at 06:52

## 2018-03-13 RX ADMIN — ROBINUL 0.4 MILLIGRAM(S): 0.2 INJECTION INTRAMUSCULAR; INTRAVENOUS at 07:24

## 2018-03-13 RX ADMIN — HYDROMORPHONE HYDROCHLORIDE 1 MILLIGRAM(S): 2 INJECTION INTRAMUSCULAR; INTRAVENOUS; SUBCUTANEOUS at 08:40

## 2018-03-13 RX ADMIN — HYDROMORPHONE HYDROCHLORIDE 0.2 MILLIGRAM(S): 2 INJECTION INTRAMUSCULAR; INTRAVENOUS; SUBCUTANEOUS at 07:15

## 2018-03-13 RX ADMIN — HYDROMORPHONE HYDROCHLORIDE 0.2 MILLIGRAM(S): 2 INJECTION INTRAMUSCULAR; INTRAVENOUS; SUBCUTANEOUS at 02:49

## 2018-03-13 RX ADMIN — Medication 0.5 MILLIGRAM(S): at 06:08

## 2018-03-13 RX ADMIN — Medication 0.5 MILLIGRAM(S): at 07:25

## 2018-03-13 NOTE — PROVIDER CONTACT NOTE (OTHER) - BACKGROUND
Pt not on home BP meds
AMS, Bradycardia , confusion, agitation,
AMS, Dementia, bradycardia, incontinence,
Admitted for AMS
Patient admitted for altered mental status
Patient admitted for altered mental status and increased agitation.  PMH of  dementia
Patient has DNR order.
Pt admitted for Altered Mental Status    Hx Alzheimer's with Dementia
Patient admitted for altered mental status and increased agitation.  PMH of  dementia
Pt admitted for AMS

## 2018-03-13 NOTE — PROVIDER CONTACT NOTE (OTHER) - ACTION/TREATMENT ORDERED:
As per NP, 1g Tylenol IV ordered.  Will continue to monitor
NP notified and aware.  No new orders at this time.  Continue to monitor patient
Awaiting PMD direction.
NP made aware. Basic metabolic panel and serum magnesium ordered stat. Continue to monitor patient
NP notified and aware.  No new orders at this time.  Continue to monitor patient
will continue to monitor
Continue to monitor & maintain safety.
NP aware. Ordere EKG, He, will keep monitoring patient
R2 pads in room, defibrilater in room, will continue to monitor
See patient expiration note

## 2018-03-13 NOTE — DISCHARGE NOTE FOR THE EXPIRED PATIENT - HOSPITAL COURSE
67M PMH dementia, bradycardia (being watched by his cardiologist no pacemaker at this time as per family) for the past x3 years, ?TIA (3 yrs ago) who was admitted on 3/3/18 for worsening incontinence and agitation x3 wks. That morning, he had been shaking and clenching his hands. Patient had CT Head and urinalysis after arrival, both of which were negative for work-up of worsening mental status. Patient was found to go bradycardic to the 30's overnight, and 2 days later with PAT to 130's. Patient was seen by EP with determination not to have PPM placed at this time, hold AVN agents. Cardiac MRI ordered to eval for sarcoidosis, which did not demonstrate infiltrative cardiomyopathy or myocardial scar, heterogeneity noted of R lobe thyroid gland. TTE EF 70%, normal LV dementions and systolic fnc. Aricept was discontinued by neuro as may cause bradycardia.     Neurology evaluation for worsening confusion, MRI brain demonstrated no new abnormalities as compared with prior CT 3/3/18. Extensive confluent chronic microvascular ischemic changes and multiple chronic lacunar infarctions were noted, with bilateral/symmetric parenchymal mineralization in cerebellar hemispheres and periventricular white matter which may represent vascular or metabolic etiology, and ?partially calcified meningioma.   Patient became more confused on 3/8 with CT H demonstrating no changes, mental status has not been improving. Evaluation by Speech and Swallow with recommendation for NPO status.    LP performed by neurology on 3/10 and EEG performed with no identifiable cause for deterioration. Discussions with team and family led to family deciding to take palliative approach and respect patient's advanced directives. Keppra was discontinued due to increased agitation, patient was started on ativan 0.5 mg TID for myoclonus.  Given worsening MS without improvement or clear etiology, family desired comfort care approach in PCU.  In the last 24 hours, he began to rapidly decline.  Breathing became very labored.  He was started on low dose Dilaudid to help with dyspnea.  Family aware of his decline and visited this AM.  Pt  comfortably on 3/13/2018.

## 2018-03-13 NOTE — PROVIDER CONTACT NOTE (OTHER) - DATE AND TIME:
04-Mar-2018 00:30
04-Mar-2018 06:15
05-Mar-2018 04:47
06-Mar-2018 01:00
07-Mar-2018 08:59
10-Mar-2018 17:45
11-Mar-2018 00:17
13-Mar-2018 10:08
11-Mar-2018 03:05
13-Mar-2018 06:20

## 2018-03-13 NOTE — PROVIDER CONTACT NOTE (OTHER) - NAME OF MD/NP/PA/DO NOTIFIED:
AARON Richard NP & Dr. Cole
BETHANIE Bowens
FARZANA Wells
MD Doherty
NP Deepika Guillen
NP Deepika Guillen
Wilmer Poe NP
kassandra Poe NP
FARZANA Julian
FARZANA Wells

## 2018-03-20 ENCOUNTER — APPOINTMENT (OUTPATIENT)
Dept: CARDIOLOGY | Facility: CLINIC | Age: 68
End: 2018-03-20

## 2022-11-03 NOTE — PATIENT PROFILE ADULT. - PRO SERVICES AT [facility]
none Calcipotriene Pregnancy And Lactation Text: This medication has not been proven safe during pregnancy. It is unknown if this medication is excreted in breast milk.

## 2023-03-25 NOTE — PROVIDER CONTACT NOTE (OTHER) - SITUATION
Hr 130 - 140 sustaining for >20min  briefly HR 39
PAT up to 124 HR for 13 seconds
Patient had PAT x1 minute, HR 130s-140s on tele monitor; appears to be asymptomatic
Patient without spontaneous respirations or pulse.
Pt had brief episodes of bradycardia 38, 39 on tele
Pt with PATs & 4 beats WCT
pt had new bradycardia 32 on tele
PAT for ~4-5 minutes, HR 130s-140s
Pt has axillary temp of 103.5, rectal temp of 104.5
None